# Patient Record
Sex: FEMALE | Race: WHITE | NOT HISPANIC OR LATINO | Employment: FULL TIME | URBAN - METROPOLITAN AREA
[De-identification: names, ages, dates, MRNs, and addresses within clinical notes are randomized per-mention and may not be internally consistent; named-entity substitution may affect disease eponyms.]

---

## 2018-03-23 ENCOUNTER — OFFICE VISIT (OUTPATIENT)
Dept: OBSTETRICS AND GYNECOLOGY | Facility: CLINIC | Age: 23
End: 2018-03-23

## 2018-03-23 VITALS
HEIGHT: 70 IN | BODY MASS INDEX: 22.19 KG/M2 | SYSTOLIC BLOOD PRESSURE: 122 MMHG | DIASTOLIC BLOOD PRESSURE: 70 MMHG | WEIGHT: 155 LBS

## 2018-03-23 DIAGNOSIS — Z01.419 WELL WOMAN EXAM WITH ROUTINE GYNECOLOGICAL EXAM: Primary | ICD-10-CM

## 2018-03-23 DIAGNOSIS — Z30.41 ENCOUNTER FOR SURVEILLANCE OF CONTRACEPTIVE PILLS: ICD-10-CM

## 2018-03-23 PROCEDURE — 99395 PREV VISIT EST AGE 18-39: CPT | Performed by: OBSTETRICS & GYNECOLOGY

## 2018-03-23 RX ORDER — NORETHINDRONE ACETATE AND ETHINYL ESTRADIOL, AND FERROUS FUMARATE 1MG-20(24)
1 KIT ORAL DAILY
Qty: 28 CAPSULE | Refills: 12 | Status: SHIPPED | OUTPATIENT
Start: 2018-03-23 | End: 2020-07-15

## 2018-03-23 NOTE — PATIENT INSTRUCTIONS
Periodic self breast examination encouraged.  Take pills as directed.  Call if further problems arise with bleeding.

## 2018-03-23 NOTE — PROGRESS NOTES
Subjective   Taty Dinh is a 22 y.o. female is here today as a self referral for annual.    History of Present Illness-here today for annual exam and checkup.  Also having difficulty with acne and unscheduled irregular bleeding on Lo Loestrin.  She has done well in the past on Generess but that is not covered by insurance.    The following portions of the patient's history were reviewed and updated as appropriate: allergies, current medications, past family history, past medical history, past social history, past surgical history and problem list.    Review of Systems   Constitutional: Negative.    HENT: Negative.    Eyes: Negative.    Respiratory: Negative.    Cardiovascular: Negative.    Gastrointestinal: Negative.    Endocrine: Negative.    Genitourinary: Positive for menstrual problem and vaginal bleeding.   Musculoskeletal: Negative.    Skin: Negative.         Acne   Allergic/Immunologic: Negative.    Neurological: Negative.    Hematological: Negative.    Psychiatric/Behavioral: Negative.        Objective   Physical Exam   Constitutional: She is oriented to person, place, and time. She appears well-developed and well-nourished.   HENT:   Head: Normocephalic and atraumatic.   Nose: Nose normal.   Eyes: Conjunctivae and EOM are normal. Pupils are equal, round, and reactive to light.   Neck: Normal range of motion. Neck supple. No thyromegaly present.   Cardiovascular: Normal rate, regular rhythm, normal heart sounds and intact distal pulses.  Exam reveals no gallop.    No murmur heard.  Pulmonary/Chest: Effort normal and breath sounds normal. No respiratory distress. She has no wheezes. She exhibits no mass, no tenderness, no swelling and no retraction. Right breast exhibits no inverted nipple, no mass, no nipple discharge, no skin change and no tenderness. Left breast exhibits no inverted nipple, no mass, no nipple discharge, no skin change and no tenderness.   Abdominal: Soft. Bowel sounds are normal. She  exhibits no distension and no mass. There is no tenderness.   Genitourinary: Rectum normal, vagina normal and uterus normal. There is no rash, tenderness, lesion or injury on the right labia. There is no rash, tenderness, lesion or injury on the left labia. Uterus is not enlarged and not tender. Cervix exhibits no motion tenderness and no discharge. Right adnexum displays no mass, no tenderness and no fullness. Left adnexum displays no mass, no tenderness and no fullness.   Musculoskeletal: Normal range of motion. She exhibits no edema, tenderness or deformity.   Neurological: She is alert and oriented to person, place, and time.   Skin: Skin is warm and dry.   Psychiatric: She has a normal mood and affect. Her behavior is normal. Judgment and thought content normal.   Nursing note and vitals reviewed.        Assessment/Plan   Problems Addressed this Visit     None      Visit Diagnoses     Well woman exam with routine gynecological exam    -  Primary    Relevant Orders    IGP,rfx Aptima HPV All Pth    Encounter for surveillance of contraceptive pills            Pap smear done today.  Samples and prescription given for Taytulla.

## 2018-03-26 LAB
CONV .: NORMAL
CYTOLOGIST CVX/VAG CYTO: NORMAL
CYTOLOGY CVX/VAG DOC THIN PREP: NORMAL
DX ICD CODE: NORMAL
HIV 1 & 2 AB SER-IMP: NORMAL
OTHER STN SPEC: NORMAL
PATH REPORT.FINAL DX SPEC: NORMAL
STAT OF ADQ CVX/VAG CYTO-IMP: NORMAL

## 2019-01-02 ENCOUNTER — HOSPITAL ENCOUNTER (OUTPATIENT)
Dept: LAB | Facility: HOSPITAL | Age: 24
Discharge: HOME | End: 2019-01-02

## 2019-01-02 LAB — RUBV IGG SER-ACNC: 48.95 [IU]/ML

## 2019-01-03 LAB
CONV MUMPS ANTIBODY IGG: 45 AU/ML
MEV IGG SER IA-ACNC: 42.7 AU/ML
VZV IGG SER IA-ACNC: 313 INDEX

## 2019-04-05 ENCOUNTER — TELEPHONE (OUTPATIENT)
Dept: OBSTETRICS AND GYNECOLOGY | Facility: CLINIC | Age: 24
End: 2019-04-05

## 2019-05-02 RX ORDER — NORETHINDRONE ACETATE AND ETHINYL ESTRADIOL, ETHINYL ESTRADIOL AND FERROUS FUMARATE 1MG-10(24)
KIT ORAL
Qty: 28 TABLET | Refills: 0 | Status: SHIPPED | OUTPATIENT
Start: 2019-05-02 | End: 2020-07-15

## 2019-05-06 ENCOUNTER — HOSPITAL ENCOUNTER (OUTPATIENT)
Dept: OTHER | Facility: HOSPITAL | Age: 24
Discharge: HOME | End: 2019-05-06

## 2019-07-01 ENCOUNTER — OFFICE VISIT (OUTPATIENT)
Dept: OBSTETRICS AND GYNECOLOGY | Facility: CLINIC | Age: 24
End: 2019-07-01

## 2019-07-01 VITALS
WEIGHT: 168 LBS | SYSTOLIC BLOOD PRESSURE: 113 MMHG | BODY MASS INDEX: 24.05 KG/M2 | DIASTOLIC BLOOD PRESSURE: 68 MMHG | HEIGHT: 70 IN

## 2019-07-01 DIAGNOSIS — Z01.419 ENCOUNTER FOR GYNECOLOGICAL EXAMINATION WITHOUT ABNORMAL FINDING: Primary | ICD-10-CM

## 2019-07-01 PROCEDURE — 99395 PREV VISIT EST AGE 18-39: CPT | Performed by: OBSTETRICS & GYNECOLOGY

## 2019-07-01 NOTE — PROGRESS NOTES
Subjective    Chief Complaint   Patient presents with   • Gynecologic Exam     AE, NO HYST       History of Present Illness    Taty Dinh is a 24 y.o. female who presents for annual exam.  Patient on no oral contraceptives currently and using condoms since was having some GI issues on them and wants to stay off them.  Not a smoker.  Regular periods currently.    Obstetric History:  OB History     No data available         Menstrual History:     Patient's last menstrual period was 06/08/2019.       Past Medical History:   Diagnosis Date   • Asthma      Family History   Problem Relation Age of Onset   • Breast cancer Maternal Aunt      Social History     Tobacco Use   Smoking Status Never Smoker     Counseling given: Not Answered      The following portions of the patient's history were reviewed and updated as appropriate: allergies, current medications, past family history, past medical history, past social history, past surgical history and problem list.    Review of Systems   Constitutional: Negative.  Negative for fever and unexpected weight change.   HENT: Negative.    Respiratory: Negative for shortness of breath and wheezing.    Cardiovascular: Negative for chest pain, palpitations and leg swelling.   Gastrointestinal: Negative for abdominal pain, anal bleeding and blood in stool.   Genitourinary: Negative for dysuria, pelvic pain, urgency, vaginal bleeding, vaginal discharge and vaginal pain.   Skin: Negative.    Neurological: Negative.    Hematological: Negative.  Negative for adenopathy.   Psychiatric/Behavioral: Negative.  Negative for dysphoric mood. The patient is not nervous/anxious.             Objective   Physical Exam   Constitutional: She is oriented to person, place, and time. Vital signs are normal. She appears well-developed and well-nourished.   HENT:   Head: Normocephalic.   Neck: Trachea normal. No tracheal deviation present. No thyromegaly present.   Cardiovascular: Normal rate, regular  "rhythm and normal heart sounds.   No murmur heard.  Pulmonary/Chest: Effort normal and breath sounds normal.   Breasts without masses, tenderness or nipple discharge   Abdominal: Soft. Normal appearance. She exhibits no mass. There is no hepatosplenomegaly. There is no tenderness. No hernia.   Genitourinary: Rectum normal, vagina normal and uterus normal. Uterus is not enlarged and not tender. Cervix exhibits no motion tenderness. Right adnexum displays no mass and no tenderness. Left adnexum displays no mass and no tenderness. No vaginal discharge found.   Genitourinary Comments: External genitalia normal    Lymphadenopathy:     She has no cervical adenopathy.     She has no axillary adenopathy.   Neurological: She is alert and oriented to person, place, and time.   Skin: Skin is warm and dry. No rash noted.   Psychiatric: She has a normal mood and affect. Her behavior is normal. Cognition and memory are normal.       /68   Ht 176.5 cm (69.5\")   Wt 76.2 kg (168 lb)   LMP 06/08/2019   BMI 24.45 kg/m²     Assessment/Plan   Taty was seen today for gynecologic exam.    Diagnoses and all orders for this visit:    Encounter for gynecological examination without abnormal finding  -     IGP,rfx Aptima HPV All Pth        Return to office in 1 year.    Counseled about various birth control options if decides wants to change off her condoms.         "

## 2019-07-04 LAB
CONV .: NORMAL
CYTOLOGIST CVX/VAG CYTO: NORMAL
CYTOLOGY CVX/VAG DOC CYTO: NORMAL
CYTOLOGY CVX/VAG DOC THIN PREP: NORMAL
DX ICD CODE: NORMAL
HIV 1 & 2 AB SER-IMP: NORMAL
OTHER STN SPEC: NORMAL
STAT OF ADQ CVX/VAG CYTO-IMP: NORMAL

## 2020-01-17 NOTE — TELEPHONE ENCOUNTER
Patients mother called her [facility] she has her upcoming annual scheduled with Vinny but is out of her birth control Norethin-Eth Estrad-Fe Biphas (LO LOESTRIN FE) 1 MG-10 MCG / 10 MCG tablet     And would like to know if refills could be sent in to her pharmacy.  
Rx sent  
[facility]

## 2020-05-19 ENCOUNTER — HOSPITAL ENCOUNTER (OUTPATIENT)
Dept: OTHER | Facility: HOSPITAL | Age: 25
Discharge: HOME | End: 2020-05-19

## 2020-05-19 LAB
ALBUMIN SERPL-MCNC: 4.3 G/DL (ref 3.5–5)
ALBUMIN/GLOB SERPL: 1.6 {RATIO} (ref 1.4–2.6)
ALP SERPL-CCNC: 75 U/L (ref 42–98)
ALT SERPL-CCNC: 11 U/L (ref 10–40)
ANION GAP SERPL CALC-SCNC: 15 MMOL/L (ref 8–19)
AST SERPL-CCNC: 17 U/L (ref 15–50)
BASOPHILS # BLD AUTO: 0.04 10*3/UL (ref 0–0.2)
BASOPHILS NFR BLD AUTO: 0.6 % (ref 0–3)
BILIRUB SERPL-MCNC: 0.53 MG/DL (ref 0.2–1.3)
BUN SERPL-MCNC: 10 MG/DL (ref 5–25)
BUN/CREAT SERPL: 10 {RATIO} (ref 6–20)
CALCIUM SERPL-MCNC: 9.1 MG/DL (ref 8.7–10.4)
CHLORIDE SERPL-SCNC: 103 MMOL/L (ref 99–111)
CHOLEST SERPL-MCNC: 164 MG/DL (ref 107–200)
CHOLEST/HDLC SERPL: 2.8 {RATIO} (ref 3–6)
CONV ABS IMM GRAN: 0.02 10*3/UL (ref 0–0.2)
CONV CO2: 24 MMOL/L (ref 22–32)
CONV IMMATURE GRAN: 0.3 % (ref 0–1.8)
CONV TOTAL PROTEIN: 7 G/DL (ref 6.3–8.2)
CREAT UR-MCNC: 0.99 MG/DL (ref 0.5–0.9)
DEPRECATED RDW RBC AUTO: 41.1 FL (ref 36.4–46.3)
EOSINOPHIL # BLD AUTO: 0.07 10*3/UL (ref 0–0.7)
EOSINOPHIL # BLD AUTO: 1.1 % (ref 0–7)
ERYTHROCYTE [DISTWIDTH] IN BLOOD BY AUTOMATED COUNT: 12.1 % (ref 11.7–14.4)
GFR SERPLBLD BASED ON 1.73 SQ M-ARVRAT: >60 ML/MIN/{1.73_M2}
GLOBULIN UR ELPH-MCNC: 2.7 G/DL (ref 2–3.5)
GLUCOSE SERPL-MCNC: 84 MG/DL (ref 65–99)
HCT VFR BLD AUTO: 43.3 % (ref 37–47)
HDLC SERPL-MCNC: 59 MG/DL (ref 40–60)
HGB BLD-MCNC: 14.3 G/DL (ref 12–16)
LDLC SERPL CALC-MCNC: 95 MG/DL (ref 70–100)
LYMPHOCYTES # BLD AUTO: 1.75 10*3/UL (ref 1–5)
LYMPHOCYTES NFR BLD AUTO: 27.2 % (ref 20–45)
MCH RBC QN AUTO: 30.8 PG (ref 27–31)
MCHC RBC AUTO-ENTMCNC: 33 G/DL (ref 33–37)
MCV RBC AUTO: 93.1 FL (ref 81–99)
MONOCYTES # BLD AUTO: 0.6 10*3/UL (ref 0.2–1.2)
MONOCYTES NFR BLD AUTO: 9.3 % (ref 3–10)
NEUTROPHILS # BLD AUTO: 3.96 10*3/UL (ref 2–8)
NEUTROPHILS NFR BLD AUTO: 61.5 % (ref 30–85)
NRBC CBCN: 0 % (ref 0–0.7)
OSMOLALITY SERPL CALC.SUM OF ELEC: 282 MOSM/KG (ref 273–304)
PLATELET # BLD AUTO: 239 10*3/UL (ref 130–400)
PMV BLD AUTO: 10.5 FL (ref 9.4–12.3)
POTASSIUM SERPL-SCNC: 4.7 MMOL/L (ref 3.5–5.3)
RBC # BLD AUTO: 4.65 10*6/UL (ref 4.2–5.4)
SODIUM SERPL-SCNC: 137 MMOL/L (ref 135–147)
TRIGL SERPL-MCNC: 48 MG/DL (ref 40–150)
TSH SERPL-ACNC: 2.05 M[IU]/L (ref 0.27–4.2)
VLDLC SERPL-MCNC: 10 MG/DL (ref 5–37)
WBC # BLD AUTO: 6.44 10*3/UL (ref 4.8–10.8)

## 2020-07-14 ENCOUNTER — OFFICE VISIT CONVERTED (OUTPATIENT)
Dept: FAMILY MEDICINE CLINIC | Age: 25
End: 2020-07-14
Attending: FAMILY MEDICINE

## 2020-07-15 ENCOUNTER — OFFICE VISIT (OUTPATIENT)
Dept: OBSTETRICS AND GYNECOLOGY | Facility: CLINIC | Age: 25
End: 2020-07-15

## 2020-07-15 ENCOUNTER — HOSPITAL ENCOUNTER (OUTPATIENT)
Dept: OTHER | Facility: HOSPITAL | Age: 25
Discharge: HOME | End: 2020-07-15
Attending: FAMILY MEDICINE

## 2020-07-15 VITALS
DIASTOLIC BLOOD PRESSURE: 70 MMHG | WEIGHT: 167 LBS | SYSTOLIC BLOOD PRESSURE: 102 MMHG | BODY MASS INDEX: 23.91 KG/M2 | HEIGHT: 70 IN

## 2020-07-15 DIAGNOSIS — Z01.419 ENCOUNTER FOR GYNECOLOGICAL EXAMINATION WITHOUT ABNORMAL FINDING: Primary | ICD-10-CM

## 2020-07-15 PROCEDURE — 99395 PREV VISIT EST AGE 18-39: CPT | Performed by: OBSTETRICS & GYNECOLOGY

## 2020-07-15 RX ORDER — ADAPALENE AND BENZOYL PEROXIDE 3; 25 MG/G; MG/G
GEL TOPICAL
COMMUNITY
Start: 2020-06-19

## 2020-07-15 RX ORDER — HYDROXYZINE HYDROCHLORIDE 25 MG/1
25 TABLET, FILM COATED ORAL DAILY
COMMUNITY
Start: 2020-06-17 | End: 2023-03-15

## 2020-07-15 RX ORDER — SPIRONOLACTONE 100 MG/1
100 TABLET, FILM COATED ORAL DAILY
COMMUNITY
Start: 2020-07-06 | End: 2022-09-02

## 2020-07-15 RX ORDER — MINOCYCLINE HYDROCHLORIDE 100 MG/1
100 CAPSULE ORAL
COMMUNITY
Start: 2020-06-18 | End: 2022-09-02

## 2020-07-15 RX ORDER — FLUOXETINE HYDROCHLORIDE 20 MG/1
20 CAPSULE ORAL DAILY
COMMUNITY
Start: 2020-06-15 | End: 2023-03-15

## 2020-07-15 RX ORDER — BUPROPION HYDROCHLORIDE 150 MG/1
150 TABLET ORAL DAILY
COMMUNITY
Start: 2020-06-17 | End: 2023-03-15

## 2020-07-15 NOTE — PROGRESS NOTES
Subjective    Chief Complaint   Patient presents with   • Gynecologic Exam     AE      History of Present Illness    Taty Dinh is a 25 y.o. female who presents for annual exam.  Non-smoker.  Regular menses.  Condoms as birth control.  No current problems.    Obstetric History:  OB History    None        Menstrual History:     Patient's last menstrual period was 06/28/2020.       Past Medical History:   Diagnosis Date   • Asthma      Family History   Problem Relation Age of Onset   • Breast cancer Maternal Aunt      Social History     Tobacco Use   Smoking Status Never Smoker         The following portions of the patient's history were reviewed and updated as appropriate: allergies, current medications, past family history, past medical history, past social history, past surgical history and problem list.    Review of Systems   Constitutional: Negative.  Negative for fever and unexpected weight change.   HENT: Negative.    Respiratory: Negative for shortness of breath and wheezing.    Cardiovascular: Negative for chest pain, palpitations and leg swelling.   Gastrointestinal: Negative for abdominal pain, anal bleeding and blood in stool.   Genitourinary: Negative for dysuria, pelvic pain, urgency, vaginal bleeding, vaginal discharge and vaginal pain.   Skin: Negative.    Neurological: Negative.    Hematological: Negative.  Negative for adenopathy.   Psychiatric/Behavioral: Negative.  Negative for dysphoric mood. The patient is not nervous/anxious.             Objective   Physical Exam   Constitutional: She is oriented to person, place, and time. Vital signs are normal. She appears well-developed and well-nourished.   HENT:   Head: Normocephalic.   Neck: Trachea normal. No tracheal deviation present. No thyromegaly present.   Cardiovascular: Normal rate, regular rhythm and normal heart sounds.   No murmur heard.  Pulmonary/Chest: Effort normal and breath sounds normal.   Breasts without masses, tenderness or  "nipple discharge   Abdominal: Soft. Normal appearance. She exhibits no mass. There is no hepatosplenomegaly. There is no tenderness. No hernia.   Genitourinary: Rectum normal, vagina normal and uterus normal. Uterus is not enlarged and not tender. Cervix exhibits no motion tenderness. Right adnexum displays no mass and no tenderness. Left adnexum displays no mass and no tenderness. No vaginal discharge found.   Genitourinary Comments: External genitalia normal    Lymphadenopathy:     She has no cervical adenopathy.     She has no axillary adenopathy.   Neurological: She is alert and oriented to person, place, and time.   Skin: Skin is warm and dry. No rash noted.   Psychiatric: She has a normal mood and affect. Her behavior is normal. Cognition and memory are normal.       /70   Ht 176.5 cm (69.5\")   Wt 75.8 kg (167 lb)   LMP 06/28/2020   BMI 24.31 kg/m²     Assessment/Plan   Taty was seen today for gynecologic exam.    Diagnoses and all orders for this visit:    Encounter for gynecological examination without abnormal finding  -     IGP,rfx Aptima HPV All Pth        Return to office 1 year or as needed.  Counseled about diet and exercise.  Discussed STD testing if ever needed.             "

## 2020-07-17 LAB — SARS-COV-2 RNA SPEC QL NAA+PROBE: NOT DETECTED

## 2020-09-29 ENCOUNTER — OFFICE VISIT (OUTPATIENT)
Dept: GASTROENTEROLOGY | Facility: CLINIC | Age: 25
End: 2020-09-29

## 2020-09-29 VITALS
HEART RATE: 89 BPM | DIASTOLIC BLOOD PRESSURE: 80 MMHG | SYSTOLIC BLOOD PRESSURE: 112 MMHG | WEIGHT: 164 LBS | TEMPERATURE: 97.8 F | OXYGEN SATURATION: 99 % | HEIGHT: 69 IN | BODY MASS INDEX: 24.29 KG/M2

## 2020-09-29 DIAGNOSIS — R68.81 EARLY SATIETY: ICD-10-CM

## 2020-09-29 DIAGNOSIS — K59.00 CONSTIPATION, UNSPECIFIED CONSTIPATION TYPE: ICD-10-CM

## 2020-09-29 DIAGNOSIS — R10.84 GENERALIZED ABDOMINAL PAIN: ICD-10-CM

## 2020-09-29 DIAGNOSIS — R19.7 DIARRHEA, UNSPECIFIED TYPE: Primary | ICD-10-CM

## 2020-09-29 DIAGNOSIS — R14.0 BLOATING: ICD-10-CM

## 2020-09-29 DIAGNOSIS — R19.4 CHANGE IN BOWEL HABITS: ICD-10-CM

## 2020-09-29 PROCEDURE — 99204 OFFICE O/P NEW MOD 45 MIN: CPT | Performed by: INTERNAL MEDICINE

## 2020-09-29 RX ORDER — PLECANATIDE 3 MG/1
3 TABLET ORAL DAILY
Qty: 90 TABLET | Refills: 3 | Status: SHIPPED | OUTPATIENT
Start: 2020-09-29 | End: 2020-10-14 | Stop reason: SDUPTHER

## 2020-09-29 NOTE — PATIENT INSTRUCTIONS
1. For further evaluation of early satiety, we will order a gastric emptying study to assess how well your stomach is emptying.    2. For further evaluation of constipation, we will schedule a Sitz marker study.     3. For constipation, we will prescribe Trulance 3 mg once daily. You will take 1 tablet daily with or without food. Samples have been provided. Prescription has been sent to your pharmacy. Wait to start Trulance until after Sitz Marker Study.     4. Plan for follow up after testing with NP for reassessment of symptoms and to discuss test results.

## 2020-09-29 NOTE — PROGRESS NOTES
Colonoscopy (Fam hx of colon cancer ), Diarrhea, Vomiting, and GI Problem (Trouble having BM)      HPI  Patient here for consultation regarding change in bowel habits with diarrhea. She reports that she can go a month between BMs.  On average, she has a BM once per week. This has been ongoing since the age of 16. She reports seeing a gastroenterologist in [facility] several years ago and was prescribed medication for constipation. She has tried MiraLax. At times she will experience explosive diarrhea, and this occurs a few times per month. She reports some blood in her stool, but is unsure if it is secondary to hemorrhoids as she has to strain.     She reports abdominal pain, which occurs frequently. She will experience accompanying nausea with the abdominal pain. Pain can be crampy in nature and is diffuse. She reports vomiting occurs at least once per month, generally when experiencing constipation. She reports abdominal bloating and feels as if she looks pregnant at times. She reports a family history of colon cancer in her great aunts. She has never undergone any imaging studies for constipation work up. She denies any abnormal thyroid lab work. She denies any recent anemia, but used to in the past when having heavy menstrual cycles.     She reports early satiety. She reports that she becomes full after eating just a few bites of food.     Review of Systems   Constitutional: Negative for appetite change, chills, diaphoresis, fatigue, fever and unexpected weight change.   HENT: Negative for dental problem, ear pain, mouth sores, rhinorrhea, sore throat and voice change.    Eyes: Negative for pain, redness and visual disturbance.   Respiratory: Negative for cough, chest tightness and wheezing.    Cardiovascular: Negative for chest pain, palpitations and leg swelling.   Endocrine: Negative for cold intolerance, heat intolerance, polydipsia, polyphagia and polyuria.   Genitourinary: Negative for dysuria, frequency,  hematuria and urgency.   Musculoskeletal: Negative for arthralgias, back pain, joint swelling, myalgias and neck pain.   Skin: Negative for rash.   Allergic/Immunologic: Negative for environmental allergies, food allergies and immunocompromised state.   Neurological: Negative for dizziness, seizures, weakness, numbness and headaches.   Hematological: Does not bruise/bleed easily.   Psychiatric/Behavioral: Negative for sleep disturbance. The patient is not nervous/anxious.         I have reviewed and confirmed the accuracy of the HPI and ROS as documented by the APRN MARYLOU Zacarias     Problem List:  There is no problem list on file for this patient.      Medical History:    Past Medical History:   Diagnosis Date   • Anxiety    • Asthma         Social History:    Social History     Socioeconomic History   • Marital status: Single     Spouse name: Not on file   • Number of children: Not on file   • Years of education: Not on file   • Highest education level: Not on file   Tobacco Use   • Smoking status: Never Smoker   • Smokeless tobacco: Never Used   Substance and Sexual Activity   • Alcohol use: Never     Frequency: Never   • Drug use: Never   • Sexual activity: Yes       Family History:   Family History   Problem Relation Age of Onset   • Breast cancer Maternal Aunt    • Colon cancer Neg Hx        Surgical History:   Past Surgical History:   Procedure Laterality Date   • FOOT SURGERY     • LABIA REDUCTION           Current Outpatient Medications:   •  buPROPion XL (WELLBUTRIN XL) 150 MG 24 hr tablet, Take 150 mg by mouth Daily., Disp: , Rfl:   •  EPIDUO FORTE 0.3-2.5 % gel, , Disp: , Rfl:   •  FLUoxetine (PROzac) 20 MG capsule, Take 20 mg by mouth Daily., Disp: , Rfl:   •  hydrOXYzine (ATARAX) 25 MG tablet, Take 25 mg by mouth Daily., Disp: , Rfl:   •  minocycline (MINOCIN,DYNACIN) 100 MG capsule, Take 100 mg by mouth., Disp: , Rfl:   •  spironolactone (ALDACTONE) 100 MG tablet, Take 100 mg by mouth Daily.,  Disp: , Rfl:     Allergies: No Known Allergies     The following portions of the patient's history were reviewed and updated as appropriate: allergies, current medications, past family history, past medical history, past social history, past surgical history and problem list.    Vitals:    09/29/20 1424   BP: 112/80   Pulse: 89   Temp: 97.8 °F (36.6 °C)   SpO2: 99%         09/29/20  1424   Weight: 74.4 kg (164 lb)     Body mass index is 23.88 kg/m².      PHYSICAL EXAM:  Physical Exam  Vitals signs reviewed.   Constitutional:       Appearance: Normal appearance. She is well-developed.   HENT:      Nose: Nose normal. No nasal deformity.   Eyes:      General: No scleral icterus.  Neck:      Trachea: No tracheal deviation.   Pulmonary:      Effort: Pulmonary effort is normal. No respiratory distress.      Breath sounds: Normal breath sounds.   Abdominal:      General: Bowel sounds are normal. There is no distension.      Palpations: Abdomen is soft. Abdomen is not rigid. There is no shifting dullness.      Tenderness: There is no abdominal tenderness. There is no guarding or rebound.      Hernia: No hernia is present.   Lymphadenopathy:      Comments: No periumbilical lymphadenopathy   Skin:     General: Skin is warm.   Neurological:      Mental Status: She is alert.   Psychiatric:         Behavior: Behavior normal.             Assessment/ Plan  Taty was seen today for colonoscopy, diarrhea, vomiting and gi problem.    Diagnoses and all orders for this visit:    Diarrhea, unspecified type    Change in bowel habits    Constipation, unspecified constipation type    Early satiety    Generalized abdominal pain    Bloating         No follow-ups on file.    Patient Instructions   1. For further evaluation of early satiety, we will order a gastric emptying study to assess how well your stomach is emptying.    2. For further evaluation of constipation, we will schedule a Sitz marker study.     3. For constipation, we will  prescribe Trulance 3 mg once daily. You will take 1 tablet daily with or without food. Samples have been provided. Prescription has been sent to your pharmacy. Wait to start Trulance until after Sitz Marker Study.     4. Plan for follow up after testing with NP for reassessment of symptoms and to discuss test results.       Documentation by Alena HICKMAN acting as a scribe in the following sections (ROS, HPI, Assessment, Plan) for the undersigned provider.       Discussion:  Patient for consultation.  She complains of early satiety and bloating we will check a gastric emptying test.  This is getting worse.  She also has worsening constipation with episodes of diarrhea after she has been constipated for several weeks.  We will check a sitz marker test and will add Trulance.

## 2020-10-01 ENCOUNTER — HOSPITAL ENCOUNTER (OUTPATIENT)
Dept: NUCLEAR MEDICINE | Facility: HOSPITAL | Age: 25
Discharge: HOME | End: 2020-10-01
Attending: INTERNAL MEDICINE

## 2020-10-05 ENCOUNTER — HOSPITAL ENCOUNTER (OUTPATIENT)
Dept: OTHER | Facility: HOSPITAL | Age: 25
Discharge: HOME | End: 2020-10-05
Attending: INTERNAL MEDICINE

## 2020-10-06 DIAGNOSIS — R68.81 EARLY SATIETY: ICD-10-CM

## 2020-10-06 DIAGNOSIS — K59.00 CONSTIPATION, UNSPECIFIED CONSTIPATION TYPE: ICD-10-CM

## 2020-10-12 ENCOUNTER — TELEPHONE (OUTPATIENT)
Dept: OBSTETRICS AND GYNECOLOGY | Facility: CLINIC | Age: 25
End: 2020-10-12

## 2020-10-12 DIAGNOSIS — K59.00 CONSTIPATION, UNSPECIFIED CONSTIPATION TYPE: ICD-10-CM

## 2020-10-12 RX ORDER — NORETHINDRONE ACETATE AND ETHINYL ESTRADIOL, ETHINYL ESTRADIOL AND FERROUS FUMARATE 1MG-10(24)
1 KIT ORAL DAILY
Qty: 28 TABLET | Refills: 12 | Status: SHIPPED | OUTPATIENT
Start: 2020-10-12 | End: 2022-09-02

## 2020-10-12 NOTE — TELEPHONE ENCOUNTER
Please tell patient I believe she was on lo Loestrin.  If she would like that sent to her pharmacy let me know and make sure you tell me which pharmacy she would like it sent to.  Thank you.  MURIEL

## 2020-10-12 NOTE — TELEPHONE ENCOUNTER
Good afternoon,  Patient stated that Lo loestrin is the rx that she was on and would like that called in if possible.    Pharmacy confirmed- [facility] in [place]     Please advise,  Thank you

## 2020-10-12 NOTE — TELEPHONE ENCOUNTER
Good morning,  Patient called and stated that she was advise to call back when she was ready to go back on her OCP.   Patient stated that she is ready but does not remember what it is that she was taking but would like to have that called back in for her if possible.    Please advise,  Thank you

## 2020-10-14 ENCOUNTER — OFFICE VISIT (OUTPATIENT)
Dept: GASTROENTEROLOGY | Facility: CLINIC | Age: 25
End: 2020-10-14

## 2020-10-14 VITALS
TEMPERATURE: 97.2 F | HEART RATE: 100 BPM | DIASTOLIC BLOOD PRESSURE: 80 MMHG | OXYGEN SATURATION: 98 % | WEIGHT: 164.8 LBS | RESPIRATION RATE: 16 BRPM | HEIGHT: 70 IN | SYSTOLIC BLOOD PRESSURE: 104 MMHG | BODY MASS INDEX: 23.59 KG/M2

## 2020-10-14 DIAGNOSIS — K62.5 RECTAL BLEEDING: ICD-10-CM

## 2020-10-14 DIAGNOSIS — K64.9 HEMORRHOIDS, UNSPECIFIED HEMORRHOID TYPE: ICD-10-CM

## 2020-10-14 DIAGNOSIS — K59.01 SLOW TRANSIT CONSTIPATION: Primary | ICD-10-CM

## 2020-10-14 PROCEDURE — 99214 OFFICE O/P EST MOD 30 MIN: CPT | Performed by: NURSE PRACTITIONER

## 2020-10-14 RX ORDER — HYDROCORTISONE 25 MG/G
CREAM TOPICAL
Qty: 30 G | Refills: 1 | Status: SHIPPED | OUTPATIENT
Start: 2020-10-14

## 2020-10-14 NOTE — PATIENT INSTRUCTIONS
1.  For constipation, continue Trulance 3 mg once daily.  New prescription has been sent to your pharmacy.    2.  For rectal bleeding likely secondary to hemorrhoids, a prescription for hydrocortisone 2.5% rectal cream has been sent to your pharmacy.  You will apply a pea-sized amount to the rectum twice daily for 7 to 10 days, and then sparingly thereafter.    3.  If you find your stool remains hard after use of Trulance for at least 3 weeks, you may add a daily stool softener called Colace or docusate sodium 100 mg that is available over-the-counter.     4.  Plan for telephone follow-up in 6 to 8 weeks for reassessment of symptoms.

## 2020-10-14 NOTE — PROGRESS NOTES
Follow-up      HPI  25-year-old female presents the office today for follow-up.  She was last seen in office on 9/29/2020.  She is a history of constipation, with occasional extensive diarrhea, abdominal pain, nausea and vomiting, early satiety, and abdominal bloating.    Gastric emptying study performed on 10/1/2020 was normal.  She reports only mild intermittent heartburn and nausea when she feels full and it has been multiple days since having a BM.  She denies any emesis or dysphagia.    She has a longstanding history of constipation, since the age of 15.  She reports that after starting birth control pills many years ago, her constipation became noticeable.  Sitz marker study performed on 9/29/2020 demonstrated retention of markers at day 5 in the transverse and descending colon, which is consistent with a diagnosis of colonic inertia following in line with her reports of chronic constipation.  Patient was started on Trulance at her last office visit.  She reports that she has only taken Trulance for the past 1.5 weeks.  She is now having a bowel movement daily, but states that it is small and hard.  She feels that she needs to give the Trulance a little more time to see how effective it will be in managing symptoms.  She reports having a TSH level drawn recently with her PCP, which she reports was normal.  She does report some bright red blood with wiping that occurs once every week or every other week.  It always follows severe straining with a large hard BM.  She reports a history of hemorrhoids.  She has not used any topical treatment for hemorrhoids.    She reports a family history of colon cancer in her maternal great aunt and uncle.  She denies any family history of colon polyps in first-degree relatives.        Review of Systems   Constitutional: Negative for appetite change, chills, diaphoresis, fatigue, fever and unexpected weight change.   HENT: Negative for dental problem, ear pain, mouth sores,  rhinorrhea, sore throat and voice change.    Eyes: Negative for pain, redness and visual disturbance.   Respiratory: Negative for cough, chest tightness and wheezing.    Cardiovascular: Negative for chest pain, palpitations and leg swelling.   Endocrine: Negative for cold intolerance, heat intolerance, polydipsia, polyphagia and polyuria.   Genitourinary: Negative for dysuria, frequency, hematuria and urgency.   Musculoskeletal: Negative for arthralgias, back pain, joint swelling, myalgias and neck pain.   Skin: Negative for rash.   Allergic/Immunologic: Negative for environmental allergies, food allergies and immunocompromised state.   Neurological: Negative for dizziness, seizures, weakness, numbness and headaches.   Hematological: Does not bruise/bleed easily.   Psychiatric/Behavioral: Negative for sleep disturbance. The patient is not nervous/anxious.           Problem List:  There is no problem list on file for this patient.      Medical History:    Past Medical History:   Diagnosis Date   • Anxiety    • Asthma         Social History:    Social History     Socioeconomic History   • Marital status: Single     Spouse name: Not on file   • Number of children: Not on file   • Years of education: Not on file   • Highest education level: Not on file   Tobacco Use   • Smoking status: Never Smoker   • Smokeless tobacco: Never Used   Substance and Sexual Activity   • Alcohol use: Never     Frequency: Never   • Drug use: Never   • Sexual activity: Yes       Family History:   Family History   Problem Relation Age of Onset   • Breast cancer Maternal Aunt    • Colon cancer Neg Hx        Surgical History:   Past Surgical History:   Procedure Laterality Date   • FOOT SURGERY     • LABIA REDUCTION           Current Outpatient Medications:   •  buPROPion XL (WELLBUTRIN XL) 150 MG 24 hr tablet, Take 150 mg by mouth Daily., Disp: , Rfl:   •  EPIDUO FORTE 0.3-2.5 % gel, , Disp: , Rfl:   •  FLUoxetine (PROzac) 20 MG capsule, Take  20 mg by mouth Daily., Disp: , Rfl:   •  hydrOXYzine (ATARAX) 25 MG tablet, Take 25 mg by mouth Daily., Disp: , Rfl:   •  minocycline (MINOCIN,DYNACIN) 100 MG capsule, Take 100 mg by mouth., Disp: , Rfl:   •  Norethin-Eth Estrad-Fe Biphas (Lo Loestrin Fe) 1 MG-10 MCG / 10 MCG tablet, Take 1 tablet by mouth Daily., Disp: 28 tablet, Rfl: 12  •  Plecanatide (Trulance) 3 MG tablet, Take 1 tablet by mouth Daily., Disp: 90 tablet, Rfl: 3  •  spironolactone (ALDACTONE) 100 MG tablet, Take 100 mg by mouth Daily., Disp: , Rfl:   •  Hydrocortisone, Perianal, (ANUSOL-HC) 2.5 % rectal cream, Apply to hemorrhoids twice daily for 10 days, and then sparingly as needed, Disp: 30 g, Rfl: 1    Allergies: No Known Allergies     The following portions of the patient's history were reviewed and updated as appropriate: allergies, current medications, past family history, past medical history, past social history, past surgical history and problem list.    Vitals:    10/14/20 0856   BP: 104/80   Pulse: 100   Resp: 16   Temp: 97.2 °F (36.2 °C)   SpO2: 98%       Physical Exam  Vitals signs reviewed.   Constitutional:       Appearance: Normal appearance. She is normal weight.   Pulmonary:      Effort: Pulmonary effort is normal. No respiratory distress.   Abdominal:      General: Abdomen is flat. Bowel sounds are normal. There is no distension.      Palpations: Abdomen is soft.      Tenderness: There is no abdominal tenderness. There is no guarding or rebound.   Musculoskeletal: Normal range of motion.   Skin:     General: Skin is warm and dry.   Neurological:      General: No focal deficit present.      Mental Status: She is alert and oriented to person, place, and time.   Psychiatric:         Mood and Affect: Mood normal.         Behavior: Behavior normal.         Thought Content: Thought content normal.         Judgment: Judgment normal.         Assessment/ Plan  Diagnoses and all orders for this visit:    1. Slow transit constipation  (Primary)    2. Hemorrhoids, unspecified hemorrhoid type    3. Rectal bleeding    Other orders  -     Plecanatide (Trulance) 3 MG tablet; Take 1 tablet by mouth Daily.  Dispense: 90 tablet; Refill: 3  -     Hydrocortisone, Perianal, (ANUSOL-HC) 2.5 % rectal cream; Apply to hemorrhoids twice daily for 10 days, and then sparingly as needed  Dispense: 30 g; Refill: 1         Return in about 6 weeks (around 11/25/2020).  1.  For constipation, continue Trulance 3 mg once daily.  New prescription has been sent to your pharmacy.    2.  For rectal bleeding likely secondary to hemorrhoids, a prescription for hydrocortisone 2.5% rectal cream has been sent to your pharmacy.  You will apply a pea-sized amount to the rectum twice daily for 7 to 10 days, and then sparingly thereafter.    3.  If you find your stool remains hard after use of Trulance for at least 3 weeks, you may add a daily stool softener called Colace or docusate sodium 100 mg that is available over-the-counter.     4.  Plan for telephone follow-up in 6 to 8 weeks for reassessment of symptoms.      Discussion:  Sitz marker study demonstrated significant delay in presence of markers in the transverse and descending colon.  Patient has been on Trulance for 1.5 weeks and is having a small hard stool each day.  She feels that she needs to give the medicine a little bit longer to see its true effect.  New prescription was sent to the patient's pharmacy, as she states that she has not yet received a call that it is ready.  Patient was encouraged to add a stool softener if in 3 to 4 weeks her stool remained hard.  We will plan for reassessment of symptoms in 6 to 8 weeks via telephone visit.  If patient continues to report hard stools, and depending upon the frequency of her bowel movements, could consider the addition of a daily stool softener or switch medication to Motegrity due to findings of colonic inertia.      For reports of rectal bleeding, which the patient  feels is secondary to hemorrhoids, given the fact that she has had to strain significantly to have BMs in the past, a prescription for topical hydrocortisone rectal cream has been sent to her pharmacy.  Rectal exam was deferred by patient. If rectal bleeding persists despite management of constipation and softer stools, to consider colonoscopy for further work-up.  Patient verbalized understanding of above.  All questions answered and support provided.    Patient verbalized understanding of above.  All questions answered and support provided.

## 2020-12-07 NOTE — PROGRESS NOTES
Chief Complaint   Patient presents with   • Follow-up     follow up post starting trulance       HPI  25-year-old female presents today for telephone follow-up.  She was last seen in office on 10/14/2020. She is a history of constipation with occasional severe diarrhea, abdominal pain, nausea and vomiting, early satiety, and bloating.    He is gastric emptying study 10/1/2020 was normal.    She reports a history of constipation since the age of 15, which seemed to worsen after initiation of birth control pills many years ago.  Sitz marker study performed on 9/29/2020 demonstrated retention of markers at day 5 from the transverse to descending colon, consistent with diagnosis of colonic inertia.  She was started on Trulance and was reporting a hard stool daily at her last office visit, but wanted to continue with the regimen to see if symptoms would improve..  Recent TSH level at her PCP office was normal.  She feels that Trulance has helped to improve her symptoms overall, particularly with the abdominal bloating.  She does have a bowel movement per day, but states that it is a very small stool and does not feel that she completely empties.  She has previously tried stool softeners, OTC at laxatives, and MiraLAX prior to Trulance.  Denies any abdominal pain.    She had reported bright red blood per rectum secondary to hemorrhoids at her last office visit.  A prescription for hydrocortisone 2.5% rectal cream was prescribed and she has experienced complete resolution of rectal bleeding since.  She denies any melena    She reports heartburn and reflux symptoms approximately 2 days/week, particularly after meals during the daytime hours.  She describes her heartburn is mild and takes Pepcid AC on an as-needed basis with good relief.  She cannot identify any specific foods that seem to contribute to symptoms.  She denies any nausea, vomiting, or dysphagia.    You have chosen to receive care through a telephone visit  today. Do you consent to use a telephone visit for your medical care today? Yes      Review of Systems   Constitutional: Negative for appetite change, chills, diaphoresis, fatigue, fever and unexpected weight change.   HENT: Negative for dental problem, ear pain, mouth sores, rhinorrhea, sore throat and voice change.    Eyes: Negative for pain, redness and visual disturbance.   Respiratory: Negative for cough, chest tightness and wheezing.    Cardiovascular: Negative for chest pain, palpitations and leg swelling.   Endocrine: Negative for cold intolerance, heat intolerance, polydipsia, polyphagia and polyuria.   Genitourinary: Negative for dysuria, frequency, hematuria and urgency.   Musculoskeletal: Negative for arthralgias, back pain, joint swelling, myalgias and neck pain.   Skin: Negative for rash.   Allergic/Immunologic: Negative for environmental allergies, food allergies and immunocompromised state.   Neurological: Negative for dizziness, seizures, weakness, numbness and headaches.   Hematological: Does not bruise/bleed easily.   Psychiatric/Behavioral: Negative for sleep disturbance. The patient is not nervous/anxious.        Problem List:  There is no problem list on file for this patient.      Medical History:    Past Medical History:   Diagnosis Date   • Anxiety    • Asthma         Social History:    Social History     Socioeconomic History   • Marital status: Single     Spouse name: Not on file   • Number of children: Not on file   • Years of education: Not on file   • Highest education level: Not on file   Tobacco Use   • Smoking status: Never Smoker   • Smokeless tobacco: Never Used   Substance and Sexual Activity   • Alcohol use: Never     Frequency: Never   • Drug use: Never   • Sexual activity: Yes       Family History:   Family History   Problem Relation Age of Onset   • Breast cancer Maternal Aunt    • Colon cancer Neg Hx        Surgical History:   Past Surgical History:   Procedure Laterality  Date   • FOOT SURGERY     • LABIA REDUCTION           Current Outpatient Medications:   •  buPROPion XL (WELLBUTRIN XL) 150 MG 24 hr tablet, Take 150 mg by mouth Daily., Disp: , Rfl:   •  EPIDUO FORTE 0.3-2.5 % gel, , Disp: , Rfl:   •  FLUoxetine (PROzac) 20 MG capsule, Take 20 mg by mouth Daily., Disp: , Rfl:   •  Hydrocortisone, Perianal, (ANUSOL-HC) 2.5 % rectal cream, Apply to hemorrhoids twice daily for 10 days, and then sparingly as needed, Disp: 30 g, Rfl: 1  •  hydrOXYzine (ATARAX) 25 MG tablet, Take 25 mg by mouth Daily., Disp: , Rfl:   •  minocycline (MINOCIN,DYNACIN) 100 MG capsule, Take 100 mg by mouth., Disp: , Rfl:   •  Norethin-Eth Estrad-Fe Biphas (Lo Loestrin Fe) 1 MG-10 MCG / 10 MCG tablet, Take 1 tablet by mouth Daily., Disp: 28 tablet, Rfl: 12  •  spironolactone (ALDACTONE) 100 MG tablet, Take 100 mg by mouth Daily., Disp: , Rfl:   •  Prucalopride Succinate 2 MG tablet, Take 2 mg by mouth Daily., Disp: 30 tablet, Rfl: 5    Allergies:  Patient has no known allergies.    The following portions of the patient's history were reviewed and updated as appropriate: allergies, current medications, past family history, past medical history, past social history, past surgical history and problem list.    Assessment/ Plan  Diagnoses and all orders for this visit:    1. Slow transit constipation (Primary)    2. Hemorrhoids, unspecified hemorrhoid type    3. Rectal bleeding    Other orders  -     Prucalopride Succinate 2 MG tablet; Take 2 mg by mouth Daily.  Dispense: 30 tablet; Refill: 5         Return in about 1 month (around 1/8/2021).    Patient Instructions   1.  Discontinue Trulance    2.  For constipation, start Motegrity 2 mg once daily.  This prescription has been sent to your pharmacy.    3.  For heartburn, we recommend that you utilize Pepcid AC daily for the next 2 weeks to see how well your symptoms improve.  If symptoms are better, then may decrease slowly and titrate according to heartburn  symptoms and utilize as needed.    .  Plan for telephone follow-up visit in 1 month for reassessment of symptoms, or sooner should symptoms worsen or fail to improve.      Discussion:  Trulance helped to produce a hard stool daily, but the patient does not feel that she fully emptied.  Due to her history of colonic inertia as evidenced by her sits marker study we will switch her medication to Motegrity and discontinue Trulance.  Prescription has been sent to pharmacy.  We will plan for telephone follow-up in 1 month for reassessment of symptoms.  May need to consider adjunct therapy based upon patient's results with concurrent use of MiraLAX.    For increased heartburn, we recommend that the patient start Pepcid AC daily at least for the next 2 weeks to see if symptoms improve, then may take as needed based upon symptoms.  If symptoms have persisted at her next office follow-up, we will plan to send in a prescription for famotidine 20 mg daily.  Patient verbalized understand above.  All questions answered and support provided.    This visit was completed as a telephone visit due to COVID-19 pandemic. This visit has been rescheduled as a phone visit to comply with patient safety concerns in accordance with CDC recommendations. Total time of discussion was 12 minutes.

## 2020-12-08 ENCOUNTER — OFFICE VISIT (OUTPATIENT)
Dept: GASTROENTEROLOGY | Facility: CLINIC | Age: 25
End: 2020-12-08

## 2020-12-08 DIAGNOSIS — K64.9 HEMORRHOIDS, UNSPECIFIED HEMORRHOID TYPE: ICD-10-CM

## 2020-12-08 DIAGNOSIS — K59.01 SLOW TRANSIT CONSTIPATION: Primary | ICD-10-CM

## 2020-12-08 DIAGNOSIS — K62.5 RECTAL BLEEDING: ICD-10-CM

## 2020-12-08 PROCEDURE — 99442 PR PHYS/QHP TELEPHONE EVALUATION 11-20 MIN: CPT | Performed by: NURSE PRACTITIONER

## 2020-12-08 NOTE — PATIENT INSTRUCTIONS
1.  Discontinue Trulance    2.  For constipation, start Motegrity 2 mg once daily.  This prescription has been sent to your pharmacy.    3.  For heartburn, we recommend that you utilize Pepcid AC daily for the next 2 weeks to see how well your symptoms improve.  If symptoms are better, then may decrease slowly and titrate according to heartburn symptoms and utilize as needed.    .  Plan for telephone follow-up visit in 1 month for reassessment of symptoms, or sooner should symptoms worsen or fail to improve.

## 2020-12-17 RX ORDER — ONDANSETRON 4 MG/1
TABLET, ORALLY DISINTEGRATING ORAL
Qty: 60 TABLET | Refills: 2 | Status: SHIPPED | OUTPATIENT
Start: 2020-12-17 | End: 2022-06-07 | Stop reason: SDUPTHER

## 2020-12-17 NOTE — TELEPHONE ENCOUNTER
Patient called and states that the Motegrity is making her nauseated. She would like to know if she can get something called in for nausea. Please advise.

## 2020-12-28 NOTE — TELEPHONE ENCOUNTER
Patient was recently prescribed Prucalopride Succinate , pt stated the new medication is causing serve abdominal pains and would like to go back on her previous med . Please advise      [phone]

## 2020-12-29 RX ORDER — LUBIPROSTONE 8 UG/1
8 CAPSULE ORAL 2 TIMES DAILY WITH MEALS
Qty: 60 CAPSULE | Refills: 5 | Status: SHIPPED | OUTPATIENT
Start: 2020-12-29 | End: 2020-12-31

## 2020-12-29 NOTE — TELEPHONE ENCOUNTER
Patient called and stated that the Amitiza was a little expensive at $36 for a 30 day supply. Patient stated that the Trulance worked ok; at least she was having a bowl movement daily. She wanted to know if it was an option to add something to the Trulance vs changing medications? Please advise.      TS

## 2020-12-30 ENCOUNTER — TELEPHONE (OUTPATIENT)
Dept: OBSTETRICS AND GYNECOLOGY | Facility: CLINIC | Age: 25
End: 2020-12-30

## 2020-12-30 RX ORDER — NORGESTREL AND ETHINYL ESTRADIOL 0.3-0.03MG
1 KIT ORAL DAILY
Qty: 28 TABLET | Refills: 12 | Status: SHIPPED | OUTPATIENT
Start: 2020-12-30

## 2020-12-30 NOTE — TELEPHONE ENCOUNTER
I do not believe there is a generic for lo Loestrin.  We could try Low-Ogestrel which is another low dose pill if she would like.  Let me know if that works for her and I will send it in.  Thank you.  MURIEL

## 2020-12-30 NOTE — TELEPHONE ENCOUNTER
Patient called she said her insurance will no longer pay for the RX Norethin-Eth Estrad-Fe Biphas (Lo Loestrin Fe) 1 MG-10 MCG / 10 MCG tablet [[id number]]. Patient stated that this is the only OCP she has taken that has worked well for her and wanted to know if there was another pill that is similar or if there is a generic version that can be prescribed.     Please advise,  Thank you    Pharmacy confirmed - [facility] - [place] - [address] [place] [address] [phone] [facility] [phone]

## 2020-12-31 RX ORDER — PLECANATIDE 3 MG/1
3 TABLET ORAL DAILY
Qty: 90 TABLET | Refills: 3 | Status: SHIPPED | OUTPATIENT
Start: 2020-12-31 | End: 2021-02-09

## 2020-12-31 NOTE — TELEPHONE ENCOUNTER
Spoke with patient, message below passed to her in full detail. She understands and does not have any follow up questions at this time. Medication pended, pharmacy verified correct in chart. Let me know if anything additional is needed.      TS

## 2021-01-11 ENCOUNTER — TELEPHONE (OUTPATIENT)
Dept: GASTROENTEROLOGY | Facility: CLINIC | Age: 26
End: 2021-01-11

## 2021-01-11 NOTE — TELEPHONE ENCOUNTER
It appears that Mellissa discontinued Trulance and started Motegrity at her last office visit.  Please clarify.    She should purchase over-the-counter Senokot 8.6 mg.  Make sure Senokot 8.6 is the active ingredients.  Take 2 at bedtime and increase the dose as needed based on manufacture maximum amount to control symptoms.    Also we need to make sure that appeal for Motegrity is pending not Trulance.  Please discuss with patient and check on insurance approval.  Thank you.

## 2021-01-11 NOTE — TELEPHONE ENCOUNTER
Got it, yes continue stool softener with Senokot 8.6 mg, 2 pills at bedtime.    [facility] is being worked on from our office.  Thank you.

## 2021-01-11 NOTE — TELEPHONE ENCOUNTER
Spoke to patient. She states that the Motegrity gave her severe abdominal pain so Mellissa switched her back to Trulance. She wants to know should she continue the stool softener with the Senokot 8.6?   Please advise.

## 2021-01-11 NOTE — TELEPHONE ENCOUNTER
An appeal is being submitted to the patients in regards to Trulance. She states that she is currently in a lot of pain, and would like a new medication sent in if possible until her Appeal decision comes back.   Please advise.

## 2021-02-08 NOTE — TELEPHONE ENCOUNTER
Patient was inquiring on the PA for her Trulance. The PA was denied by the patients insurance. She would like to know if there is another medication that could be sent in to replace this.  Please advise.

## 2021-02-25 ENCOUNTER — OFFICE VISIT (OUTPATIENT)
Dept: GASTROENTEROLOGY | Facility: CLINIC | Age: 26
End: 2021-02-25

## 2021-02-25 DIAGNOSIS — K59.01 SLOW TRANSIT CONSTIPATION: Primary | Chronic | ICD-10-CM

## 2021-02-25 DIAGNOSIS — K21.9 GASTROESOPHAGEAL REFLUX DISEASE, UNSPECIFIED WHETHER ESOPHAGITIS PRESENT: Chronic | ICD-10-CM

## 2021-02-25 PROCEDURE — 99441 PR PHYS/QHP TELEPHONE EVALUATION 5-10 MIN: CPT | Performed by: NURSE PRACTITIONER

## 2021-02-25 NOTE — PATIENT INSTRUCTIONS
1.  For heartburn, continue famotidine 20 mg once daily.    2.  For slow transit constipation, continue Linzess 72 mcg.  We will have you increase your stool softener to 1 capsule twice daily.  If you find that this causes your stools to become too loose, we recommend you back down to once daily dosing.  If you continue to struggle with constipation, or if the Linzess does not seem to be working as well, please contact our office and we will plan to increase the strength of your Linzess.    3.  We will plan for telephone follow-up in 6 months for reassessment of symptoms, or sooner should symptoms worsen or fail to improve.

## 2021-02-25 NOTE — PROGRESS NOTES
Chief Complaint   Patient presents with   • Follow-up     GERD and constipation         History of Present Illness  25-year-old female presents today for telephone follow-up.  She was last seen in office on 12/8/2020.  She has a history of slow transit constipation and heartburn.    At her last office visit we placed her on famotidine 20 mg once daily, which she states has made a significant improvement in her heartburn and reflux symptoms.  She denies having any breakthrough symptoms.  She denies any nausea, vomiting, or dysphagia.    She has a history of slow transit constipation, as evidenced on sits marker studies noted below.  She is currently taking Linzess 72 mcg and 1 stool softener daily.  She has been on this regimen for the past 1 to 2 weeks and states that she is having a bowel movement daily to every other day.  She denies having very much straining.  She questions whether or not she should increase her stool softener dosing.  She denies any melena or hematochezia.  Her weight is stable and appetite is good.    You have chosen to receive care through a telephone visit.   Do you consent to use a telephone visit for your medical care today? Yes    Review of Systems   HENT: Negative for trouble swallowing.    Cardiovascular: Negative for chest pain.   Gastrointestinal: Positive for constipation. Negative for abdominal distention, abdominal pain, anal bleeding, blood in stool, diarrhea, nausea, rectal pain and vomiting.      Result Review :{ Labs  Result Review  Imaging  Med Tab  Media :23}      XR Abdomen 1 View (10/05/2020)  XR Abdomen 1 View (10/01/2020)  NM Gastric Emptying (10/01/2020)  Office Visit with Alena Odonnell APRN (12/08/2020)    Assessment and Plan    Diagnoses and all orders for this visit:    1. Slow transit constipation (Primary)    2. Gastroesophageal reflux disease, unspecified whether esophagitis present         This visit has been rescheduled as a phone visit to comply with  patient safety concerns in accordance with CDC recommendations. Total time of discussion was 10 minutes.    Follow Up   Return in about 6 months (around 8/25/2021).    Patient Instructions   1.  For heartburn, continue famotidine 20 mg once daily.    2.  For slow transit constipation, continue Linzess 72 mcg.  We will have you increase your stool softener to 1 capsule twice daily.  If you find that this causes your stools to become too loose, we recommend you back down to once daily dosing.  If you continue to struggle with constipation, or if the Linzess does not seem to be working as well, please contact our office and we will plan to increase the strength of your Linzess.    3.  We will plan for telephone follow-up in 6 months for reassessment of symptoms, or sooner should symptoms worsen or fail to improve.       Discussion:    Patient has responded well to use of famotidine 20 mg for heartburn daily, which we will have her continue.    For slow transit constipation, Linzess seems to help the patient produce a bowel movement daily to every other day in conjunction with a daily stool softener.  She does not strain as much, but does have straining at times.  We have recommended that she add an additional stool softener daily, for a total of 2.  Should stools become loose, she was recommended to decrease her stool softener dosing.  Should constipation worsen, we will plan to increase her Linzess to 145 mcg daily.  We will plan for telephone follow-up in 6 months for reassessment of symptoms, or sooner should symptoms worsen or fail to improve.  Patient verbalized understanding of above plan of care and is in agreement.  All questions answered and support provided.

## 2021-05-10 ENCOUNTER — HOSPITAL ENCOUNTER (OUTPATIENT)
Dept: OTHER | Facility: HOSPITAL | Age: 26
Discharge: HOME | End: 2021-05-10

## 2021-05-10 LAB
ALBUMIN SERPL-MCNC: 4 G/DL (ref 3.5–5)
ALBUMIN/GLOB SERPL: 1.1 {RATIO} (ref 1.4–2.6)
ALP SERPL-CCNC: 53 U/L (ref 42–98)
ALT SERPL-CCNC: 10 U/L (ref 10–40)
ANION GAP SERPL CALC-SCNC: 14 MMOL/L (ref 8–19)
AST SERPL-CCNC: 15 U/L (ref 15–50)
BASOPHILS # BLD AUTO: 0.06 10*3/UL (ref 0–0.2)
BASOPHILS NFR BLD AUTO: 0.8 % (ref 0–3)
BILIRUB SERPL-MCNC: 0.34 MG/DL (ref 0.2–1.3)
BUN SERPL-MCNC: 12 MG/DL (ref 5–25)
BUN/CREAT SERPL: 12 {RATIO} (ref 6–20)
CALCIUM SERPL-MCNC: 9 MG/DL (ref 8.7–10.4)
CHLORIDE SERPL-SCNC: 102 MMOL/L (ref 99–111)
CHOLEST SERPL-MCNC: 162 MG/DL (ref 107–200)
CHOLEST/HDLC SERPL: 2.7 {RATIO} (ref 3–6)
CONV ABS IMM GRAN: 0.02 10*3/UL (ref 0–0.2)
CONV CO2: 24 MMOL/L (ref 22–32)
CONV IMMATURE GRAN: 0.3 % (ref 0–1.8)
CONV TOTAL PROTEIN: 7.6 G/DL (ref 6.3–8.2)
CREAT UR-MCNC: 1.02 MG/DL (ref 0.5–0.9)
DEPRECATED RDW RBC AUTO: 40.7 FL (ref 36.4–46.3)
EOSINOPHIL # BLD AUTO: 0.14 10*3/UL (ref 0–0.7)
EOSINOPHIL # BLD AUTO: 1.9 % (ref 0–7)
ERYTHROCYTE [DISTWIDTH] IN BLOOD BY AUTOMATED COUNT: 12.2 % (ref 11.7–14.4)
GFR SERPLBLD BASED ON 1.73 SQ M-ARVRAT: >60 ML/MIN/{1.73_M2}
GLOBULIN UR ELPH-MCNC: 3.6 G/DL (ref 2–3.5)
GLUCOSE SERPL-MCNC: 85 MG/DL (ref 65–99)
HCT VFR BLD AUTO: 41.8 % (ref 37–47)
HDLC SERPL-MCNC: 60 MG/DL (ref 40–60)
HGB BLD-MCNC: 14.3 G/DL (ref 12–16)
LDLC SERPL CALC-MCNC: 82 MG/DL (ref 70–100)
LYMPHOCYTES # BLD AUTO: 2 10*3/UL (ref 1–5)
LYMPHOCYTES NFR BLD AUTO: 27.9 % (ref 20–45)
MCH RBC QN AUTO: 31 PG (ref 27–31)
MCHC RBC AUTO-ENTMCNC: 34.2 G/DL (ref 33–37)
MCV RBC AUTO: 90.7 FL (ref 81–99)
MONOCYTES # BLD AUTO: 0.65 10*3/UL (ref 0.2–1.2)
MONOCYTES NFR BLD AUTO: 9.1 % (ref 3–10)
NEUTROPHILS # BLD AUTO: 4.31 10*3/UL (ref 2–8)
NEUTROPHILS NFR BLD AUTO: 60 % (ref 30–85)
NRBC CBCN: 0 % (ref 0–0.7)
OSMOLALITY SERPL CALC.SUM OF ELEC: 281 MOSM/KG (ref 273–304)
PLATELET # BLD AUTO: 298 10*3/UL (ref 130–400)
PMV BLD AUTO: 9.9 FL (ref 9.4–12.3)
POTASSIUM SERPL-SCNC: 4.4 MMOL/L (ref 3.5–5.3)
RBC # BLD AUTO: 4.61 10*6/UL (ref 4.2–5.4)
SODIUM SERPL-SCNC: 136 MMOL/L (ref 135–147)
TRIGL SERPL-MCNC: 101 MG/DL (ref 40–150)
TSH SERPL-ACNC: 2.11 M[IU]/L (ref 0.27–4.2)
VLDLC SERPL-MCNC: 20 MG/DL (ref 5–37)
WBC # BLD AUTO: 7.18 10*3/UL (ref 4.8–10.8)

## 2021-05-18 NOTE — PROGRESS NOTES
Taty Dinh  1995     Office/Outpatient Visit    Visit Date: Tue, Jul 14, 2020 05:02 pm    Provider: Shamar Rivera MD (Assistant: Kathrine Clayton LPN)    Location: [facility]        Electronically signed by Shamar Rivera MD on  07/14/2020 06:06:29 PM                             Subjective:        CC: Ms. Dinh is a 25 year old White female.  sore throat headaches and diarrhea nausea;         HPI:           Patient to be evaluated for acute upper respiratory infection, unspecified.  These have been present for the past 4 days.  The symptoms include headache, diarrhea, nausea and sore throat.  She denies body aches, chest congestion, Chills, cough, fever, nasal congestion, nasal discharge or sinus pain/pressure.  She denies exposure to ill contacts.  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 She has already tried to relieve the symptoms with acetaminophen.  Medical history is significant for asthma.      ROS:     CONSTITUTIONAL:  Negative for chills, fatigue and fever.      E/N/T:  Positive for sore throat.   Negative for ear pain, tinnitus, nasal congestion, frequent rhinorrhea or sinus pressure.      CARDIOVASCULAR:  Negative for chest pain, dizziness, palpitations and edema.      RESPIRATORY:  Negative for dyspnea and cough.      GASTROINTESTINAL:  Positive for diarrhea and nausea.   Negative for abdominal pain or vomiting.      MUSCULOSKELETAL:  Negative for arthralgias and myalgias.      INTEGUMENTARY/BREAST:  Negative for rash, breast mass and skin changes of breast.      NEUROLOGICAL:  Positive for headaches.   Negative for paresthesias or weakness.          Past Medical History / Family History / Social History:         Last Reviewed on 7/14/2020 06:06 PM by Shamar Rivera    Past Medical History:                 PAST MEDICAL HISTORY         Chicken pox     Asthma     Staph R knee Nov 2008         GYNECOLOGICAL HISTORY:    Dr. Leija, ob/gyn  Sexually Active? yes         PAST MEDICAL HISTORY         Positive for    Depression and    Anxiety;         Surgical History:         foreign body removed from left foot;         Family History:         Positive for Coronary Artery Disease and Myocardial Infarction;         Social History:     Occupation: Shoozy at [employer];     Marital Status: Single     Children: None         Tobacco/Alcohol/Supplements:     Last Reviewed on 7/14/2020 06:06 PM by Shamar Rivera    Tobacco: She has never smoked.          Substance Abuse History:     Last Reviewed on 7/14/2020 06:06 PM by Shamar Rivera    None         Mental Health History:     Last Reviewed on 7/14/2020 06:06 PM by Shamar Rivera        Communicable Diseases (eg STDs):     Last Reviewed on 7/14/2020 06:06 PM by Shamar Rivera        Current Problems:     Last Reviewed on 7/14/2020 06:06 PM by Shamar Rivera    Acne    Asthma, NOS    Branchial cleft cyst    Major depressive disorder, recurrent, unspecified    Generalized anxiety disorder    Streptococcal pharyngitis    Acute upper respiratory infection, unspecified        Immunizations:     DTaP 1995    DTaP 1995    DTaP 1995    DTaP 9/5/1996    DTaP 6/4/1999    Td adult 6/10/2004    Hib PRP-OMP (3-dose) 1995    Hib PRP-OMP (3-dose) 9/5/1996    Hib PRP-OMP (3-dose) 1995    Hib PRP-OMP (3-dose) 1995    Hep B (pedi/adol, 3-dose schedule) 1995    Hep B (pedi/adol, 3-dose schedule) 8/2/1990    Hep B (pedi/adol, 3-dose schedule) 1995    zzGardasil 10/31/2008    zzGardasil 12/29/2008    zzGardasil 4/2/2009    OPV  Poliovirus, live (oral) 1995    OPV  Poliovirus, live (oral) 1995    OPV  Poliovirus, live (oral) 1995    OPV  Poliovirus, live (oral) 9/5/1996    OPV  Poliovirus, live (oral) 6/4/1999    MMR  (Measles-Mumps-Rubella), live 9/5/1996    MMR  (Measles-Mumps-Rubella), live 6/4/1999    Varicella, live 5/28/1996    Fluzone (3 + years dose) 11/30/2012     Influenza, split virus (3+ years dose) 10/3/2008    Influenza, split virus (3+ years dose) 12/8/2009    Menactra (Meningococcal MCV4P) 10/3/2008    Tdap (Tetanus, reduced diph, acellular pertussis) 11/20/2009    Rotavirus, pentavalent RV5 (3 dose) 1995    Rotavirus, pentavalent RV5 (3 dose) 1995    Rotavirus, pentavalent RV5 (3 dose) 1995        Allergies:     Last Reviewed on 7/14/2020 06:06 PM by Shamar Rivera    No Known Allergies.        Current Medications:     Last Reviewed on 7/14/2020 06:06 PM by Shamar Rivera    Albuterol 90mcg/1actuation Oral Inhaler [Inhale 2 puff(s) by mouth q 4 to 6 hr]    FLUoxetine 20 mg oral capsule [take 3 capsule (20 mg) by oral route daily]    spironolactone 100 mg oral tablet [take 1 tablet (100 mg) by oral route daily]    buPROPion  mg oral Tablet, Extended Release 24 hr [take 1 tablet (150 mg) by oral route once daily]    minocycline 100 mg oral capsule [take 1 capsule (100 mg) by oral route daily]    hydrOXYzine HCL 25 mg oral tablet [take 1-2 tablet (25 mg) by oral route 3 times per day as needed]    Nature Blend NAC Acetycysteine 600mg  BID     Breo Ellipta 100-25 mcg/dose Inhalation Blister, With Inhalation Device [inhale 1 puff by inhalation route once daily at the same time each day]        Objective:        Vitals:         Current: 7/14/2020 5:05:50 PM    Ht:  5 ft, 7.5 in;  Wt: 167.4 lbs;  BMI: 25.8T: 98.1 F (temporal);  BP: 103/69 mm Hg (right arm, sitting);  P: 82 bpm (right arm (BP Cuff), sitting)        Exams:     PHYSICAL EXAM:     GENERAL: vital signs recorded - well developed, well nourished;  no apparent distress;     EYES: conjunctiva and cornea are normal;     E/N/T: EARS:  normal external auditory canals and tympanic membranes;  grossly normal hearing; NOSE:  normal nasal mucosa, septum, turbinates, and sinuses; OROPHARYNX: posterior pharynx shows 2+ tonsils, a posterior pharyngeal exudate, and erythema;     NECK: trachea is midline;  "thyroid is non-palpable;     RESPIRATORY: Clear to auscultation bilaterally; no rales (\"crackles\") present; no rhonchi; no wheezes;     CARDIOVASCULAR: normal rate; rhythm is regular;  No murmurs. clicks, gallops or rubs appreciated; no edema;     GASTROINTESTINAL: nontender; Soft and nondistended; normal bowel sounds; no organomegaly; no masses;     LYMPHATIC: right anterior cervical node;  no supraclavicular nodes;     BREAST/INTEGUMENT: No significant rashes, lesions or suspicious moles within limits of examination;     NEUROLOGIC: Grossly intact; mental status: alert and oriented x 3;     PSYCHIATRIC: appropriate affect and demeanor; normal speech pattern; Normal behavior;         Lab/Test Results:         Rapid Strep Screen: Positive (07/14/2020),     Performed by:: rene (07/14/2020),             Assessment:         J02.0   Streptococcal pharyngitis           ORDERS:         Meds Prescribed:       [New Rx] amoxicillin 500 mg oral tablet [take 1 tablet (500 mg) by oral route 2 times per day], #20 (twenty) tablets, Refills: 0 (zero)         Radiology/Test Orders:       [id number]  COVID 19 Testing  (Send-Out)              Lab Orders:       [id number]  Group A Streptococcus detection by immunoassay with direct optical observation  (In-House)                      Plan:         Streptococcal pharyngitis- Rapid strep is positive.  Will cover with amoxicillin 5 mg twice daily x10 days.  Prior to this result being obtained, patient was swabbed for COVID-19 as she is a healthcare worker and her symptoms qualify her for testing. Tylenol and/or Motrin as needed for fever/discomfort.  Chloraseptic spray or Cepacol lozenges for odynophagia. ED/return precautions given.          Prescriptions:       [New Rx] amoxicillin 500 mg oral tablet [take 1 tablet (500 mg) by oral route 2 times per day], #20 (twenty) tablets, Refills: 0 (zero)           Orders:       [id number]  COVID 19 Testing  (Send-Out)            [id number]  Group A Streptococcus " detection by immunoassay with direct optical observation  (In-House)                  Charge Capture:         Primary Diagnosis:     J02.0  Streptococcal pharyngitis           Orders:      [id number]  Office visit - new pt, level 3  (In-House)            [id number]  Group A Streptococcus detection by immunoassay with direct optical observation  (In-House)

## 2021-07-02 VITALS
SYSTOLIC BLOOD PRESSURE: 103 MMHG | HEIGHT: 68 IN | BODY MASS INDEX: 25.37 KG/M2 | TEMPERATURE: 98.1 F | WEIGHT: 167.4 LBS | HEART RATE: 82 BPM | DIASTOLIC BLOOD PRESSURE: 69 MMHG

## 2021-07-21 ENCOUNTER — OFFICE VISIT (OUTPATIENT)
Dept: OBSTETRICS AND GYNECOLOGY | Facility: CLINIC | Age: 26
End: 2021-07-21

## 2021-07-21 VITALS
WEIGHT: 153 LBS | HEIGHT: 70 IN | SYSTOLIC BLOOD PRESSURE: 116 MMHG | DIASTOLIC BLOOD PRESSURE: 62 MMHG | BODY MASS INDEX: 21.9 KG/M2

## 2021-07-21 DIAGNOSIS — Z30.41 ENCOUNTER FOR SURVEILLANCE OF CONTRACEPTIVE PILLS: ICD-10-CM

## 2021-07-21 DIAGNOSIS — Z01.419 ENCOUNTER FOR GYNECOLOGICAL EXAMINATION WITHOUT ABNORMAL FINDING: Primary | ICD-10-CM

## 2021-07-21 PROCEDURE — 99395 PREV VISIT EST AGE 18-39: CPT | Performed by: OBSTETRICS & GYNECOLOGY

## 2021-07-21 RX ORDER — NORGESTREL-ETHINYL ESTRADIOL 0.3-0.03MG
1 TABLET ORAL DAILY
Qty: 28 TABLET | Refills: 12 | Status: SHIPPED | OUTPATIENT
Start: 2021-07-21 | End: 2022-08-03 | Stop reason: SDUPTHER

## 2021-07-21 NOTE — PROGRESS NOTES
Subjective    Chief Complaint   Patient presents with   • Gynecologic Exam     Patient is here for a annual.      History of Present Illness    Taty Dinh is a 26 y.o. female who presents for annual exam.  Non-smoker.  Uses Cryselle OCPs.  Currently starting her period today.  No problems.  Did see a gastroenterologist and GI issues are now resolved.    Obstetric History:  OB History    No obstetric history on file.        Menstrual History:     No LMP recorded. (Menstrual status: Oral contraceptives).       Past Medical History:   Diagnosis Date   • Anxiety    • Asthma      Family History   Problem Relation Age of Onset   • Breast cancer Maternal Aunt    • Colon cancer Neg Hx    • Colon polyps Neg Hx      Social History     Tobacco Use   Smoking Status Never Smoker   Smokeless Tobacco Never Used         The following portions of the patient's history were reviewed and updated as appropriate: allergies, current medications, past family history, past medical history, past social history, past surgical history and problem list.    Review of Systems   Constitutional: Negative.  Negative for fever and unexpected weight change.   HENT: Negative.    Respiratory: Negative for shortness of breath and wheezing.    Cardiovascular: Negative for chest pain, palpitations and leg swelling.   Gastrointestinal: Negative for abdominal pain, anal bleeding and blood in stool.   Genitourinary: Negative for dysuria, pelvic pain, urgency, vaginal bleeding, vaginal discharge and vaginal pain.   Skin: Negative.    Neurological: Negative.    Hematological: Negative.  Negative for adenopathy.   Psychiatric/Behavioral: Negative.  Negative for dysphoric mood. The patient is not nervous/anxious.             Objective   Physical Exam  Exam conducted with a chaperone present.   Constitutional:       Appearance: Normal appearance. She is well-developed.   HENT:      Head: Normocephalic.   Neck:      Thyroid: No thyromegaly.      Trachea:  "Trachea normal. No tracheal deviation.   Cardiovascular:      Rate and Rhythm: Normal rate and regular rhythm.      Heart sounds: Normal heart sounds. No murmur heard.     Pulmonary:      Effort: Pulmonary effort is normal.      Breath sounds: Normal breath sounds.   Chest:      Breasts:         Right: Normal. No mass, nipple discharge or tenderness.         Left: Normal. No mass, nipple discharge or tenderness.   Abdominal:      Palpations: Abdomen is soft. There is no mass.      Tenderness: There is no abdominal tenderness.      Hernia: No hernia is present.   Genitourinary:     General: Normal vulva.      Labia:         Right: No tenderness or lesion.         Left: No tenderness or lesion.       Urethra: No prolapse or urethral lesion.      Vagina: Normal. No vaginal discharge or lesions.      Cervix: No cervical motion tenderness.      Uterus: Not enlarged and not tender.       Adnexa:         Right: No mass or tenderness.          Left: No mass or tenderness.        Rectum: Normal. No external hemorrhoid or internal hemorrhoid. Normal anal tone.      Comments: External genitalia normal   Lymphadenopathy:      Cervical: No cervical adenopathy.      Upper Body:      Right upper body: No axillary adenopathy.      Left upper body: No axillary adenopathy.   Skin:     General: Skin is warm and dry.      Findings: No rash.   Neurological:      Mental Status: She is alert and oriented to person, place, and time.   Psychiatric:         Behavior: Behavior normal.         /62   Ht 176.5 cm (69.5\")   Wt 69.4 kg (153 lb)   Breastfeeding No   BMI 22.27 kg/m²     Assessment/Plan   Diagnoses and all orders for this visit:    1. Encounter for gynecological examination without abnormal finding (Primary)  -     IGP,rfx Aptima HPV All Pth    2. Encounter for surveillance of contraceptive pills    Other orders  -     norgestrel-ethinyl estradiol (Cryselle-28) 0.3-30 MG-MCG per tablet; Take 1 tablet by mouth Daily.  " Dispense: 28 tablet; Refill: 12        Return to office 1 year.  Counseled about diet and exercise and currently losing weight.

## 2021-08-18 ENCOUNTER — OFFICE VISIT (OUTPATIENT)
Dept: GASTROENTEROLOGY | Facility: CLINIC | Age: 26
End: 2021-08-18

## 2021-08-18 DIAGNOSIS — K59.01 SLOW TRANSIT CONSTIPATION: Chronic | ICD-10-CM

## 2021-08-18 DIAGNOSIS — K21.9 GASTROESOPHAGEAL REFLUX DISEASE, UNSPECIFIED WHETHER ESOPHAGITIS PRESENT: Primary | Chronic | ICD-10-CM

## 2021-08-18 DIAGNOSIS — K64.9 HEMORRHOIDS, UNSPECIFIED HEMORRHOID TYPE: Chronic | ICD-10-CM

## 2021-08-18 PROCEDURE — 99441 PR PHYS/QHP TELEPHONE EVALUATION 5-10 MIN: CPT | Performed by: NURSE PRACTITIONER

## 2021-08-18 NOTE — PATIENT INSTRUCTIONS
1.  For heartburn or reflux, you may continue Pepcid AC once daily.    2.  For constipation, continue Linzess 72 mcg once daily.  Refills have been sent to your pharmacy.    3.  For intermittent flaring of hemorrhoids, you may continue to use hydrocortisone 2.5% rectal cream as prescribed.     4.  We will plan for annual office follow-up for reassessment of symptoms and medication refills, or sooner should symptoms recur.

## 2021-08-18 NOTE — PROGRESS NOTES
Chief Complaint   Patient presents with   • Follow-up     GERD, constipation, hemorrhoids           History of Present Illness  26-year-old female presents today for telephone follow-up.  She was last seen in office on 12/18/2020.  She has a history of alternating bowel habits, that predominantly consist of constipation with sporadic episodes of severe diarrhea, nausea and vomiting, early satiety, and bloating.  Gastric emptying study on 10/1/2020 was normal.  Sits marker study performed on 9/29/2020 revealed retention of markers from the transverse to descending colon consistent with colonic inertia.  He continues Linzess 72 mcg once daily which produces a bowel movement daily.  She denies any diarrhea, melena, or hematochezia.  TSH level was normal.  She has a history of hemorrhoids and was prescribed hydrocortisone cream which resolved her issues.  Also, now that her constipation is well managed she no longer experiences problems with her hemorrhoids.    Her last office visit she was reporting heartburn and reflux and was recommended to start Pepcid AC daily to see if symptoms improved.  She continues Pepcid AC daily and reports that symptoms are well controlled.  She reports occasional nausea, but states that it is very mild and infrequent.  She denies any vomiting or dysphagia.    You have chosen to receive care through a telephone visit.   Do you consent to use a telephone visit for your medical care today? Yes    Review of Systems   Constitutional: Negative for fatigue, fever and unexpected weight change.   HENT: Negative for trouble swallowing.    Cardiovascular: Negative for chest pain.   Gastrointestinal: Negative for abdominal distention, abdominal pain, anal bleeding, blood in stool, constipation, diarrhea, nausea, rectal pain and vomiting.      Result Review :      Office Visit with Alena Odonnell APRN (12/08/2020)  SCANNED - LABS (07/15/2020)  Office Visit with Alena Odonnell APRN  (12/08/2020)    Assessment and Plan    Diagnoses and all orders for this visit:    1. Gastroesophageal reflux disease, unspecified whether esophagitis present (Primary)    2. Slow transit constipation    3. Hemorrhoids, unspecified hemorrhoid type    Other orders  -     linaclotide (LINZESS) 72 MCG capsule capsule; TAKE ONE CAPSULE BY MOUTH EVERY MORNING BEFORE BREAKFAST  Dispense: 90 capsule; Refill: 3           This visit has been rescheduled as a phone visit to comply with patient safety concerns in accordance with CDC recommendations. Total time of discussion was 6 minutes.    Follow Up   Return in about 1 year (around 8/18/2022).    Patient Instructions   1.  For heartburn or reflux, you may continue Pepcid AC once daily.    2.  For constipation, continue Linzess 72 mcg once daily.  Refills have been sent to your pharmacy.    3.  For intermittent flaring of hemorrhoids, you may continue to use hydrocortisone 2.5% rectal cream as prescribed.     4.  We will plan for annual office follow-up for reassessment of symptoms and medication refills, or sooner should symptoms recur.         Discussion:    GERD is well managed with Pepcid AC, which patient may continue and use daily, or as needed.  Constipation is well managed with Linzess 72 mcg once daily which we will continue.  Refills have been sent to her pharmacy.  Hydrocortisone cream to be used on as-needed basis for hemorrhoids.  We will plan for telephone follow-up visit in 1 year for reassessment of symptoms and medication refills, or sooner should symptoms recur.  Patient verbalized understand above plan of care and is in agreement.  All questions answered and support provided.      EMR Dragon/Transcription Disclaimer:  This document has been Dictated utilizing Dragon dictation.

## 2021-12-28 ENCOUNTER — HOSPITAL ENCOUNTER (EMERGENCY)
Facility: HOSPITAL | Age: 26
Discharge: HOME | End: 2021-12-28
Attending: EMERGENCY MEDICINE | Admitting: EMERGENCY MEDICINE

## 2021-12-28 VITALS
HEIGHT: 69 IN | TEMPERATURE: 99 F | DIASTOLIC BLOOD PRESSURE: 76 MMHG | BODY MASS INDEX: 24.52 KG/M2 | OXYGEN SATURATION: 100 % | HEART RATE: 88 BPM | SYSTOLIC BLOOD PRESSURE: 110 MMHG | WEIGHT: 165.57 LBS | RESPIRATION RATE: 16 BRPM
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
## 2021-12-28 LAB
ALBUMIN SERPL-MCNC: 4.6 G/DL (ref 3.5–5.2)
ALBUMIN/GLOB SERPL: 1.5 G/DL
ALP SERPL-CCNC: 59 U/L (ref 39–117)
ALT SERPL W P-5'-P-CCNC: 14 U/L (ref 1–33)
ANION GAP SERPL CALCULATED.3IONS-SCNC: 9.3 MMOL/L (ref 5–15)
AST SERPL-CCNC: 20 U/L (ref 1–32)
BASOPHILS # BLD AUTO: 0.06 10*3/MM3 (ref 0–0.2)
BASOPHILS NFR BLD AUTO: 0.9 % (ref 0–1.5)
BILIRUB SERPL-MCNC: 0.2 MG/DL (ref 0–1.2)
BUN SERPL-MCNC: 10 MG/DL (ref 6–20)
BUN/CREAT SERPL: 9.8 (ref 7–25)
CALCIUM SPEC-SCNC: 9.3 MG/DL (ref 8.6–10.5)
CHLORIDE SERPL-SCNC: 100 MMOL/L (ref 98–107)
CO2 SERPL-SCNC: 26.7 MMOL/L (ref 22–29)
CREAT SERPL-MCNC: 1.02 MG/DL (ref 0.57–1)
DEPRECATED RDW RBC AUTO: 40.8 FL (ref 37–54)
EOSINOPHIL # BLD AUTO: 0.15 10*3/MM3 (ref 0–0.4)
EOSINOPHIL NFR BLD AUTO: 2.2 % (ref 0.3–6.2)
ERYTHROCYTE [DISTWIDTH] IN BLOOD BY AUTOMATED COUNT: 12.4 % (ref 12.3–15.4)
GFR SERPL CREATININE-BSD FRML MDRD: 66 ML/MIN/1.73
GLOBULIN UR ELPH-MCNC: 3 GM/DL
GLUCOSE SERPL-MCNC: 92 MG/DL (ref 65–99)
HBV SURFACE AG SERPL QL IA: NORMAL
HCG INTACT+B SERPL-ACNC: <0.5 MIU/ML
HCT VFR BLD AUTO: 41.1 % (ref 34–46.6)
HCV AB SER DONR QL: NORMAL
HGB BLD-MCNC: 14.1 G/DL (ref 12–15.9)
HIV1+2 AB SER QL: NORMAL
IMM GRANULOCYTES # BLD AUTO: 0.01 10*3/MM3 (ref 0–0.05)
IMM GRANULOCYTES NFR BLD AUTO: 0.1 % (ref 0–0.5)
LYMPHOCYTES # BLD AUTO: 2.28 10*3/MM3 (ref 0.7–3.1)
LYMPHOCYTES NFR BLD AUTO: 33.9 % (ref 19.6–45.3)
MCH RBC QN AUTO: 31.2 PG (ref 26.6–33)
MCHC RBC AUTO-ENTMCNC: 34.3 G/DL (ref 31.5–35.7)
MCV RBC AUTO: 90.9 FL (ref 79–97)
MONOCYTES # BLD AUTO: 0.64 10*3/MM3 (ref 0.1–0.9)
MONOCYTES NFR BLD AUTO: 9.5 % (ref 5–12)
NEUTROPHILS NFR BLD AUTO: 3.59 10*3/MM3 (ref 1.7–7)
NEUTROPHILS NFR BLD AUTO: 53.4 % (ref 42.7–76)
NRBC BLD AUTO-RTO: 0 /100 WBC (ref 0–0.2)
PLATELET # BLD AUTO: 299 10*3/MM3 (ref 140–450)
PMV BLD AUTO: 9.8 FL (ref 6–12)
POTASSIUM SERPL-SCNC: 4.2 MMOL/L (ref 3.5–5.2)
PROT SERPL-MCNC: 7.6 G/DL (ref 6–8.5)
RBC # BLD AUTO: 4.52 10*6/MM3 (ref 3.77–5.28)
SODIUM SERPL-SCNC: 136 MMOL/L (ref 136–145)
WBC NRBC COR # BLD: 6.73 10*3/MM3 (ref 3.4–10.8)

## 2021-12-28 PROCEDURE — 84702 CHORIONIC GONADOTROPIN TEST: CPT | Performed by: NURSE PRACTITIONER

## 2021-12-28 PROCEDURE — G0432 EIA HIV-1/HIV-2 SCREEN: HCPCS | Performed by: NURSE PRACTITIONER

## 2021-12-28 PROCEDURE — 86803 HEPATITIS C AB TEST: CPT | Performed by: NURSE PRACTITIONER

## 2021-12-28 PROCEDURE — 86704 HEP B CORE ANTIBODY TOTAL: CPT | Performed by: NURSE PRACTITIONER

## 2021-12-28 PROCEDURE — 36415 COLL VENOUS BLD VENIPUNCTURE: CPT | Performed by: NURSE PRACTITIONER

## 2021-12-28 PROCEDURE — 99283 EMERGENCY DEPT VISIT LOW MDM: CPT

## 2021-12-28 PROCEDURE — 85025 COMPLETE CBC W/AUTO DIFF WBC: CPT | Performed by: NURSE PRACTITIONER

## 2021-12-28 PROCEDURE — 90740 HEPB VACC 3 DOSE IMMUNSUP IM: CPT | Performed by: NURSE PRACTITIONER

## 2021-12-28 PROCEDURE — G0010 ADMIN HEPATITIS B VACCINE: HCPCS | Performed by: NURSE PRACTITIONER

## 2021-12-28 PROCEDURE — 25010000002 HEPATITIS B VACCINE (RECOMBINANT) 20 MCG/ML SUSPENSION: Performed by: NURSE PRACTITIONER

## 2021-12-28 PROCEDURE — 87340 HEPATITIS B SURFACE AG IA: CPT | Performed by: NURSE PRACTITIONER

## 2021-12-28 PROCEDURE — 80053 COMPREHEN METABOLIC PANEL: CPT | Performed by: NURSE PRACTITIONER

## 2021-12-28 PROCEDURE — 36415 COLL VENOUS BLD VENIPUNCTURE: CPT

## 2021-12-28 RX ADMIN — HEPATITIS B VACCINE (RECOMBINANT) 20 MCG: 20 INJECTION, SUSPENSION INTRAMUSCULAR at 19:11

## 2021-12-29 LAB — HBV CORE AB SERPL QL IA: NEGATIVE

## 2021-12-30 ENCOUNTER — TRANSCRIBE ORDERS (OUTPATIENT)
Dept: ADMINISTRATIVE | Facility: HOSPITAL | Age: 26
End: 2021-12-30

## 2021-12-30 ENCOUNTER — LAB (OUTPATIENT)
Dept: LAB | Facility: HOSPITAL | Age: 26
End: 2021-12-30

## 2021-12-30 DIAGNOSIS — Z20.5 EXPOSURE TO HEPATITIS B: Primary | ICD-10-CM

## 2021-12-30 DIAGNOSIS — Z20.5 EXPOSURE TO HEPATITIS B: ICD-10-CM

## 2021-12-30 LAB — HBV SURFACE AB SER RIA-ACNC: NORMAL

## 2021-12-30 PROCEDURE — 86706 HEP B SURFACE ANTIBODY: CPT

## 2021-12-30 PROCEDURE — 36415 COLL VENOUS BLD VENIPUNCTURE: CPT

## 2022-01-06 ENCOUNTER — HOSPITAL ENCOUNTER (EMERGENCY)
Facility: HOSPITAL | Age: 27
Discharge: HOME | End: 2022-01-06
Attending: EMERGENCY MEDICINE | Admitting: EMERGENCY MEDICINE

## 2022-01-06 VITALS
TEMPERATURE: 98.4 F | OXYGEN SATURATION: 99 % | DIASTOLIC BLOOD PRESSURE: 78 MMHG | BODY MASS INDEX: 24.75 KG/M2 | RESPIRATION RATE: 16 BRPM | HEIGHT: 69 IN | SYSTOLIC BLOOD PRESSURE: 120 MMHG | WEIGHT: 167.11 LBS | HEART RATE: 88 BPM

## 2022-01-06 DIAGNOSIS — Z20.9: Primary | ICD-10-CM

## 2022-01-06 PROCEDURE — 99282 EMERGENCY DEPT VISIT SF MDM: CPT

## 2022-01-06 PROCEDURE — 90371 HEP B IG IM: CPT | Performed by: PHYSICIAN ASSISTANT

## 2022-01-06 PROCEDURE — 25010000002 HEPATITIS B IMMUNE GLOBULIN 312 UNIT/ML SOLUTION: Performed by: PHYSICIAN ASSISTANT

## 2022-01-06 PROCEDURE — 96372 THER/PROPH/DIAG INJ SC/IM: CPT

## 2022-01-06 RX ADMIN — HEPATITIS B IMMUNE GLOBULIN (HUMAN) 4.55 ML: 1560 LIQUID INTRAMUSCULAR at 11:10

## 2022-01-06 NOTE — ED PROVIDER NOTES
Subjective   Pt states she is here for hep b immune globulin injection.  Pt sent here by employee health.  Blood exposure was 12/28.       History provided by:  Patient  Body Fluid Exposure  Type of exposure:  Needle stick  Exposure substance: blood    Exposure location:  Hand      Review of Systems   Constitutional: Negative for appetite change, chills, fatigue and fever.   HENT: Negative.    Eyes: Negative.  Negative for photophobia.   Respiratory: Negative.    Gastrointestinal: Negative.    Endocrine: Negative.    Genitourinary: Negative.    Musculoskeletal: Negative.    Skin: Negative.    Allergic/Immunologic: Negative.  Negative for immunocompromised state.   Neurological: Negative.    Hematological: Negative.    Psychiatric/Behavioral: Negative.    All other systems reviewed and are negative.      Past Medical History:   Diagnosis Date   • Anxiety    • Asthma        Allergies   Allergen Reactions   • Linaclotide Hives       Past Surgical History:   Procedure Laterality Date   • FOOT SURGERY     • LABIA REDUCTION         Family History   Problem Relation Age of Onset   • Breast cancer Maternal Aunt    • Colon cancer Neg Hx    • Colon polyps Neg Hx    • Crohn's disease Neg Hx    • Irritable bowel syndrome Neg Hx    • Ulcerative colitis Neg Hx        Social History     Socioeconomic History   • Marital status: Single   Tobacco Use   • Smoking status: Never Smoker   • Smokeless tobacco: Never Used   Vaping Use   • Vaping Use: Never used   Substance and Sexual Activity   • Alcohol use: Not Currently   • Drug use: Never   • Sexual activity: Yes           Objective   Physical Exam  Vitals and nursing note reviewed.   Constitutional:       General: She is not in acute distress.     Appearance: Normal appearance. She is not toxic-appearing.   HENT:      Head: Normocephalic and atraumatic.   Cardiovascular:      Rate and Rhythm: Normal rate and regular rhythm.      Pulses: Normal pulses.      Heart sounds: Normal heart  sounds.   Pulmonary:      Effort: Pulmonary effort is normal. No respiratory distress.      Breath sounds: Normal breath sounds.   Musculoskeletal:         General: Normal range of motion.      Cervical back: Normal range of motion and neck supple.   Skin:     General: Skin is warm and dry.   Neurological:      Mental Status: She is alert and oriented to person, place, and time. Mental status is at baseline.             MDM  Number of Diagnoses or Management Options  Diagnosis management comments: Per employee health. Will give hep b immune globulin. Pt given information on vaccine.     Risk of Complications, Morbidity, and/or Mortality  Presenting problems: moderate  Diagnostic procedures: moderate  Management options: moderate    Patient Progress  Patient progress: stable      Final diagnoses:   Exposure to body fluid of infectious patient       ED Disposition  ED Disposition     ED Disposition Condition Comment    Discharge Stable           Love Tran PA  [address]   [place] [address]  [phone]               Medication List      No changes were made to your prescriptions during this visit.          Michael Mansfield PA  01/06/22 1533

## 2022-06-07 RX ORDER — ONDANSETRON 4 MG/1
TABLET, ORALLY DISINTEGRATING ORAL
Qty: 60 TABLET | Refills: 2 | Status: SHIPPED | OUTPATIENT
Start: 2022-06-07

## 2022-08-03 ENCOUNTER — OFFICE VISIT (OUTPATIENT)
Dept: OBSTETRICS AND GYNECOLOGY | Facility: CLINIC | Age: 27
End: 2022-08-03

## 2022-08-03 VITALS
WEIGHT: 173 LBS | HEIGHT: 69 IN | SYSTOLIC BLOOD PRESSURE: 111 MMHG | DIASTOLIC BLOOD PRESSURE: 78 MMHG | BODY MASS INDEX: 25.62 KG/M2

## 2022-08-03 DIAGNOSIS — Z01.419 ENCOUNTER FOR GYNECOLOGICAL EXAMINATION WITHOUT ABNORMAL FINDING: Primary | ICD-10-CM

## 2022-08-03 DIAGNOSIS — Z30.41 ENCOUNTER FOR SURVEILLANCE OF CONTRACEPTIVE PILLS: ICD-10-CM

## 2022-08-03 PROCEDURE — 99395 PREV VISIT EST AGE 18-39: CPT | Performed by: OBSTETRICS & GYNECOLOGY

## 2022-08-03 RX ORDER — NORGESTREL-ETHINYL ESTRADIOL 0.3-0.03MG
1 TABLET ORAL DAILY
Qty: 84 TABLET | Refills: 3 | Status: SHIPPED | OUTPATIENT
Start: 2022-08-03

## 2022-08-03 NOTE — PROGRESS NOTES
Subjective    Chief Complaint   Patient presents with   • Gynecologic Exam     AE      History of Present Illness    Taty Dinh is a 27 y.o. female who presents for annual exam.  Non-smoker.  Patient on Cryselle OCPs.  Patient getting  in March and may want to conceive after.  No current problems.    Obstetric History:  OB History    No obstetric history on file.        Menstrual History:     No LMP recorded. (Menstrual status: Oral contraceptives).       Past Medical History:   Diagnosis Date   • Anxiety    • Asthma      Family History   Problem Relation Age of Onset   • Breast cancer Maternal Aunt    • Colon cancer Neg Hx    • Colon polyps Neg Hx    • Crohn's disease Neg Hx    • Irritable bowel syndrome Neg Hx    • Ulcerative colitis Neg Hx      Social History     Tobacco Use   Smoking Status Never Smoker   Smokeless Tobacco Never Used         The following portions of the patient's history were reviewed and updated as appropriate: allergies, current medications, past family history, past medical history, past social history, past surgical history and problem list.    Review of Systems   Constitutional: Negative.  Negative for fever and unexpected weight change.   HENT: Negative.    Respiratory: Negative for shortness of breath and wheezing.    Cardiovascular: Negative for chest pain, palpitations and leg swelling.   Gastrointestinal: Negative for abdominal pain, anal bleeding and blood in stool.   Genitourinary: Negative for dysuria, pelvic pain, urgency, vaginal bleeding, vaginal discharge and vaginal pain.   Skin: Negative.    Neurological: Negative.    Hematological: Negative.  Negative for adenopathy.   Psychiatric/Behavioral: Negative.  Negative for dysphoric mood. The patient is not nervous/anxious.             Objective   Physical Exam  Exam conducted with a chaperone present.   Constitutional:       Appearance: Normal appearance. She is well-developed.   HENT:      Head: Normocephalic.  "  Neck:      Thyroid: No thyromegaly.      Trachea: Trachea normal. No tracheal deviation.   Cardiovascular:      Rate and Rhythm: Normal rate and regular rhythm.      Heart sounds: Normal heart sounds. No murmur heard.  Pulmonary:      Effort: Pulmonary effort is normal.      Breath sounds: Normal breath sounds.   Chest:   Breasts:      Right: Normal. No mass, nipple discharge, tenderness or axillary adenopathy.      Left: Normal. No mass, nipple discharge, tenderness or axillary adenopathy.       Abdominal:      Palpations: Abdomen is soft. There is no mass.      Tenderness: There is no abdominal tenderness.      Hernia: No hernia is present.   Genitourinary:     General: Normal vulva.      Labia:         Right: No tenderness or lesion.         Left: No tenderness or lesion.       Urethra: No prolapse or urethral lesion.      Vagina: Normal. No vaginal discharge or lesions.      Cervix: No cervical motion tenderness.      Uterus: Not enlarged and not tender.       Adnexa:         Right: No mass or tenderness.          Left: No mass or tenderness.        Rectum: Normal. No external hemorrhoid or internal hemorrhoid. Normal anal tone.      Comments: External genitalia normal   Lymphadenopathy:      Cervical: No cervical adenopathy.      Upper Body:      Right upper body: No axillary adenopathy.      Left upper body: No axillary adenopathy.   Skin:     General: Skin is warm and dry.      Findings: No rash.   Neurological:      Mental Status: She is alert and oriented to person, place, and time.   Psychiatric:         Behavior: Behavior normal.         /78   Ht 175.3 cm (69\")   Wt 78.5 kg (173 lb)   BMI 25.55 kg/m²     Assessment & Plan   Diagnoses and all orders for this visit:    1. Encounter for gynecological examination without abnormal finding (Primary)  -     IGP,rfx Aptima HPV All Pth    2. Encounter for surveillance of contraceptive pills    Other orders  -     norgestrel-ethinyl estradiol " (Cryselle-Reggie) 0.3-30 MG-MCG per tablet; Take 1 tablet by mouth Daily.  Dispense: 84 tablet; Refill: 3        Return to office 1 year.  Counseled about when to start colorectal screening and screening for breast cancer with negative family history.  Counseled about beginning prenatal vitamin when stopping OCPs.

## 2022-09-02 ENCOUNTER — OFFICE VISIT (OUTPATIENT)
Dept: GASTROENTEROLOGY | Facility: CLINIC | Age: 27
End: 2022-09-02

## 2022-09-02 DIAGNOSIS — K59.01 SLOW TRANSIT CONSTIPATION: Primary | Chronic | ICD-10-CM

## 2022-09-02 DIAGNOSIS — K21.9 GASTROESOPHAGEAL REFLUX DISEASE, UNSPECIFIED WHETHER ESOPHAGITIS PRESENT: Chronic | ICD-10-CM

## 2022-09-02 PROCEDURE — 99441 PR PHYS/QHP TELEPHONE EVALUATION 5-10 MIN: CPT | Performed by: NURSE PRACTITIONER

## 2022-09-02 NOTE — PATIENT INSTRUCTIONS
1.  For GERD, continue Pepcid daily.    2.  For constipation, will increase her Linzess to 290 mcg once daily.  New prescription has been sent to pharmacy.  We will take this medication each morning on empty stomach.    3.  We will plan for telephone follow-up visit in 6 months, which is scheduled for 3/15/2023 at 10:45 AM, or sooner should symptoms worsen/fail to improve.

## 2022-09-02 NOTE — PROGRESS NOTES
History of Present Illness  27-year female presents today for telephone follow-up.  She was last seen in office on 8/18/2021.  She has a history of alternating bowel habits, predominantly constipation dominant with sporadic episodes of diarrhea.  Previous gastric emptying study October 2020 was normal.  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    For management of constipation secondary to colonic inertia, patient continues Linzess 145 mcg daily.  However, she states it is not working the greatest to produce bowel movements.  She does report having a bowel movement daily, but many times her stool consists of small pellets and is of small volume.  She has not had any further episodes of diarrhea.  She does have to strain at times.  She takes 2 stool softeners daily in conjunction with her Linzess.  She denies any melena or hematochezia.  She will have occasional abdominal discomfort, but states it is not anything significant.    She has a history of hemorrhoids and utilizes topical hydrocortisone cream when needed.    You have chosen to receive care through a telephone visit.   Do you consent to use a telephone visit for your medical care today? Yes    Review of Systems   Constitutional: Negative for fever and unexpected weight change.   HENT: Negative for trouble swallowing.    Gastrointestinal: Positive for abdominal pain and constipation. Negative for abdominal distention, anal bleeding, blood in stool, diarrhea, nausea, rectal pain and vomiting.      Result Review :      Office Visit with lAena Odonnell APRN (08/18/2021)  XR Abdomen 1 View (10/05/2020 13:04)  XR Abdomen 1 View (10/01/2020 14:45)    Assessment and Plan    Diagnoses and all orders for this visit:    1. Slow transit constipation (Primary)    2. Gastroesophageal reflux disease, unspecified whether esophagitis present    Other orders  -     linaclotide (Linzess) 290 MCG capsule capsule; Take 1 capsule by mouth Every  Morning Before Breakfast.  Dispense: 90 capsule; Refill: 3         This visit has been rescheduled as a phone visit to comply with patient safety concerns in accordance with CDC recommendations. Total time of discussion was 9 minutes.    Patient Instructions   1.  For GERD, continue Pepcid daily.    2.  For constipation, will increase her Linzess to 290 mcg once daily.  New prescription has been sent to pharmacy.  We will take this medication each morning on empty stomach.    3.  We will plan for telephone follow-up visit in 6 months, which is scheduled for 3/15/2023 at 10:45 AM, or sooner should symptoms worsen/fail to improve.     Discussion:    Patient to continue Pepcid daily for management of GERD as symptoms are well controlled.  We will increase her Linzess to 290 mcg daily as the 145 mcg daily dosing continues to produce hard pellet-like stools.  Patient to continue stool softeners, to daily.  New prescription for the Linzess has been sent to her pharmacy.  We recommend office follow-up in 6 months for reassessment of symptoms, or sooner should symptoms worsen or fail to improve.  Patient verbalized understanding and is in agreement.  All questions answered and support provided.    EMR Dragon/Transcription Disclaimer:  This document has been Dictated utilizing Dragon dictation.

## 2023-03-15 ENCOUNTER — OFFICE VISIT (OUTPATIENT)
Dept: GASTROENTEROLOGY | Facility: CLINIC | Age: 28
End: 2023-03-15
Payer: COMMERCIAL

## 2023-03-15 DIAGNOSIS — R12 HEARTBURN: Chronic | ICD-10-CM

## 2023-03-15 DIAGNOSIS — K64.9 HEMORRHOIDS, UNSPECIFIED HEMORRHOID TYPE: Chronic | ICD-10-CM

## 2023-03-15 DIAGNOSIS — K59.01 SLOW TRANSIT CONSTIPATION: Primary | Chronic | ICD-10-CM

## 2023-03-15 PROCEDURE — 99441 PR PHYS/QHP TELEPHONE EVALUATION 5-10 MIN: CPT | Performed by: NURSE PRACTITIONER

## 2023-03-15 NOTE — PATIENT INSTRUCTIONS
1.  For constipation, continue Linzess 290 mcg once daily.  You may also continue to use a stool softener as needed.    2.  For intermittent heartburn, you may continue to use famotidine 20 mg daily as needed.    3.  For intermittent hemorrhoid flares, you may continue to use topical hydrocortisone cream as prescribed.    4.  We will plan for annual office follow-up via telephone visit on 3/20/2024 at 10:15 AM, or sooner should symptoms worsen/recur.

## 2023-03-15 NOTE — PROGRESS NOTES
Chief Complaint   Patient presents with   • Follow-up     Constipation, heartburn, hemorrhoids         History of Present Illness  27-year-old female presents today for telephone follow-up.  She was last seen via telephone visit on 9/2/2022.  She has a history of alternating bowel habits with predominant constipation and intermittent episodes of diarrhea.  She was noted to have colonic inertia on Sitz Marker Study September 2020.  Previous GES October 2020 was normal.    At her last office visit we increased her Linzess dosing from 145 mcg daily to 290 mcg daily. She reports having BMs daily. She still has to take a stool softener occasionally. Topical hydrocortisone cream is used when needed for management of her hemorrhoids, but she does not have to use this as often now that her BMs are regulated.    For GERD, she has not had to take Pepcid as frequently. She will only take 1 tab 3 days per week or so at the 20 mg dose.     You have chosen to receive care through a telephone visit.   Do you consent to use a telephone visit for your medical care today? Yes    Review of Systems   Constitutional: Negative for fever and unexpected weight change.   HENT: Negative for trouble swallowing.    Cardiovascular: Negative for chest pain.   Gastrointestinal: Negative for abdominal distention, abdominal pain, anal bleeding, blood in stool, constipation, diarrhea, nausea, rectal pain and vomiting.      Result Review :      Office Visit with Alena Odonnell APRN (09/02/2022)  XR Abdomen 1 View (10/05/2020 13:04)  Hepatitis B Core Antibody, Total (08/11/2022 10:03)    Assessment and Plan    Diagnoses and all orders for this visit:    1. Slow transit constipation (Primary)    2. Heartburn    3. Hemorrhoids, unspecified hemorrhoid type         This visit has been rescheduled as a phone visit to comply with patient safety concerns in accordance with CDC recommendations. Total time of discussion was 8 minutes.    Patient  Instructions   1.  For constipation, continue Linzess 290 mcg once daily.  You may also continue to use a stool softener as needed.    2.  For intermittent heartburn, you may continue to use famotidine 20 mg daily as needed.    3.  For intermittent hemorrhoid flares, you may continue to use topical hydrocortisone cream as prescribed.    4.  We will plan for annual office follow-up via telephone visit on 3/20/2024 at 10:15 AM, or sooner should symptoms worsen/recur.       Discussion:    Patient is doing very well with the increased dose of Linzess at 290 mcg once daily which is producing a bowel movement daily.  We will continue this regimen.  Patient may continue to use hydrocortisone cream as needed for management of hemorrhoids, although her use of this has decreased significantly since her bowel movements are now much better controlled.    Patient to continue to use Pepcid 20 mg daily as needed for GERD.    Plan for telephone follow-up visit in 1 year on 3/20/2024 at 10:15 AM, or sooner should symptoms worsen/recur.  Patient verbalized understanding of above plan of care and is in agreement.  All questions answered and support provided.  EMR Dragon/Transcription Disclaimer:  This document has been Dictated utilizing Dragon dictation.